# Patient Record
Sex: FEMALE | Employment: FULL TIME | URBAN - METROPOLITAN AREA
[De-identification: names, ages, dates, MRNs, and addresses within clinical notes are randomized per-mention and may not be internally consistent; named-entity substitution may affect disease eponyms.]

---

## 2019-02-18 ENCOUNTER — TRANSFERRED RECORDS (OUTPATIENT)
Dept: HEALTH INFORMATION MANAGEMENT | Facility: CLINIC | Age: 34
End: 2019-02-18

## 2019-02-18 LAB
HPV ABSTRACT: NORMAL
PAP-ABSTRACT: NORMAL

## 2019-09-18 ENCOUNTER — PRENATAL OFFICE VISIT (OUTPATIENT)
Dept: NURSING | Facility: CLINIC | Age: 34
End: 2019-09-18
Payer: COMMERCIAL

## 2019-09-18 VITALS
HEIGHT: 66 IN | WEIGHT: 157.9 LBS | HEART RATE: 74 BPM | TEMPERATURE: 97.7 F | BODY MASS INDEX: 25.38 KG/M2 | DIASTOLIC BLOOD PRESSURE: 80 MMHG | SYSTOLIC BLOOD PRESSURE: 150 MMHG

## 2019-09-18 DIAGNOSIS — Z87.59 HISTORY OF POLYHYDRAMNIOS: ICD-10-CM

## 2019-09-18 DIAGNOSIS — O09.529 AMA (ADVANCED MATERNAL AGE) MULTIGRAVIDA 35+: ICD-10-CM

## 2019-09-18 DIAGNOSIS — Z87.59 HISTORY OF GESTATIONAL HYPERTENSION: ICD-10-CM

## 2019-09-18 PROBLEM — I10 ESSENTIAL HYPERTENSION: Status: ACTIVE | Noted: 2018-02-06

## 2019-09-18 LAB
ABO + RH BLD: NORMAL
ABO + RH BLD: NORMAL
ALBUMIN UR-MCNC: NEGATIVE MG/DL
ALT SERPL W P-5'-P-CCNC: 32 U/L (ref 0–50)
APPEARANCE UR: CLEAR
AST SERPL W P-5'-P-CCNC: 23 U/L (ref 0–45)
BILIRUB UR QL STRIP: NEGATIVE
BLD GP AB SCN SERPL QL: NORMAL
BLOOD BANK CMNT PATIENT-IMP: NORMAL
COLOR UR AUTO: YELLOW
CREAT SERPL-MCNC: 0.58 MG/DL (ref 0.52–1.04)
CREAT UR-MCNC: 31 MG/DL
ERYTHROCYTE [DISTWIDTH] IN BLOOD BY AUTOMATED COUNT: 12.9 % (ref 10–15)
GFR SERPL CREATININE-BSD FRML MDRD: >90 ML/MIN/{1.73_M2}
GLUCOSE UR STRIP-MCNC: NEGATIVE MG/DL
HCT VFR BLD AUTO: 36.4 % (ref 35–47)
HGB BLD-MCNC: 12.3 G/DL (ref 11.7–15.7)
HGB UR QL STRIP: NEGATIVE
KETONES UR STRIP-MCNC: NEGATIVE MG/DL
LEUKOCYTE ESTERASE UR QL STRIP: NEGATIVE
MCH RBC QN AUTO: 31 PG (ref 26.5–33)
MCHC RBC AUTO-ENTMCNC: 33.8 G/DL (ref 31.5–36.5)
MCV RBC AUTO: 92 FL (ref 78–100)
NITRATE UR QL: NEGATIVE
PH UR STRIP: 5.5 PH (ref 5–7)
PLATELET # BLD AUTO: 272 10E9/L (ref 150–450)
PROT UR-MCNC: <0.05 G/L
PROT/CREAT 24H UR: NORMAL G/G CR (ref 0–0.2)
RBC # BLD AUTO: 3.97 10E12/L (ref 3.8–5.2)
SOURCE: NORMAL
SP GR UR STRIP: <=1.005 (ref 1–1.03)
SPECIMEN EXP DATE BLD: NORMAL
UROBILINOGEN UR STRIP-ACNC: 0.2 EU/DL (ref 0.2–1)
WBC # BLD AUTO: 6.8 10E9/L (ref 4–11)

## 2019-09-18 PROCEDURE — 82565 ASSAY OF CREATININE: CPT | Performed by: OBSTETRICS & GYNECOLOGY

## 2019-09-18 PROCEDURE — 86780 TREPONEMA PALLIDUM: CPT | Performed by: OBSTETRICS & GYNECOLOGY

## 2019-09-18 PROCEDURE — 85027 COMPLETE CBC AUTOMATED: CPT | Performed by: OBSTETRICS & GYNECOLOGY

## 2019-09-18 PROCEDURE — 84450 TRANSFERASE (AST) (SGOT): CPT | Performed by: OBSTETRICS & GYNECOLOGY

## 2019-09-18 PROCEDURE — 36415 COLL VENOUS BLD VENIPUNCTURE: CPT | Performed by: OBSTETRICS & GYNECOLOGY

## 2019-09-18 PROCEDURE — 87340 HEPATITIS B SURFACE AG IA: CPT | Performed by: OBSTETRICS & GYNECOLOGY

## 2019-09-18 PROCEDURE — 84460 ALANINE AMINO (ALT) (SGPT): CPT | Performed by: OBSTETRICS & GYNECOLOGY

## 2019-09-18 PROCEDURE — 86850 RBC ANTIBODY SCREEN: CPT | Performed by: OBSTETRICS & GYNECOLOGY

## 2019-09-18 PROCEDURE — 87086 URINE CULTURE/COLONY COUNT: CPT | Performed by: OBSTETRICS & GYNECOLOGY

## 2019-09-18 PROCEDURE — 86762 RUBELLA ANTIBODY: CPT | Performed by: OBSTETRICS & GYNECOLOGY

## 2019-09-18 PROCEDURE — 84156 ASSAY OF PROTEIN URINE: CPT | Performed by: OBSTETRICS & GYNECOLOGY

## 2019-09-18 PROCEDURE — 87389 HIV-1 AG W/HIV-1&-2 AB AG IA: CPT | Performed by: OBSTETRICS & GYNECOLOGY

## 2019-09-18 PROCEDURE — 81003 URINALYSIS AUTO W/O SCOPE: CPT | Performed by: OBSTETRICS & GYNECOLOGY

## 2019-09-18 PROCEDURE — 86900 BLOOD TYPING SEROLOGIC ABO: CPT | Performed by: OBSTETRICS & GYNECOLOGY

## 2019-09-18 PROCEDURE — 86901 BLOOD TYPING SEROLOGIC RH(D): CPT | Performed by: OBSTETRICS & GYNECOLOGY

## 2019-09-18 PROCEDURE — 99207 ZZC NO CHARGE NURSE ONLY: CPT

## 2019-09-18 RX ORDER — LABETALOL 100 MG/1
100 TABLET, FILM COATED ORAL 2 TIMES DAILY
Status: ON HOLD | COMMUNITY
End: 2020-04-25

## 2019-09-18 SDOH — HEALTH STABILITY: MENTAL HEALTH
STRESS IS WHEN SOMEONE FEELS TENSE, NERVOUS, ANXIOUS, OR CAN'T SLEEP AT NIGHT BECAUSE THEIR MIND IS TROUBLED. HOW STRESSED ARE YOU?: NOT AT ALL

## 2019-09-18 SDOH — SOCIAL STABILITY: SOCIAL INSECURITY
WITHIN THE LAST YEAR, HAVE YOU BEEN KICKED, HIT, SLAPPED, OR OTHERWISE PHYSICALLY HURT BY YOUR PARTNER OR EX-PARTNER?: NO

## 2019-09-18 SDOH — SOCIAL STABILITY: SOCIAL INSECURITY: WITHIN THE LAST YEAR, HAVE YOU BEEN AFRAID OF YOUR PARTNER OR EX-PARTNER?: NO

## 2019-09-18 SDOH — SOCIAL STABILITY: SOCIAL INSECURITY
WITHIN THE LAST YEAR, HAVE TO BEEN RAPED OR FORCED TO HAVE ANY KIND OF SEXUAL ACTIVITY BY YOUR PARTNER OR EX-PARTNER?: NO

## 2019-09-18 SDOH — SOCIAL STABILITY: SOCIAL INSECURITY: WITHIN THE LAST YEAR, HAVE YOU BEEN HUMILIATED OR EMOTIONALLY ABUSED IN OTHER WAYS BY YOUR PARTNER OR EX-PARTNER?: NO

## 2019-09-18 ASSESSMENT — MIFFLIN-ST. JEOR: SCORE: 1422.98

## 2019-09-18 NOTE — LETTER
September 19, 2019      Kandi Parker  [id number] [facility]  [place] [address]        Dear ,    We are writing to inform you of your test results.    Your test results fall within the expected range(s) or remain unchanged from previous results.  Please continue with current treatment plan.    Resulted Orders   Hepatitis B surface antigen   Result Value Ref Range    Hep B Surface Agn Nonreactive NR^Nonreactive   HIV Antigen Antibody Combo   Result Value Ref Range    HIV Antigen Antibody Combo Nonreactive NR^Nonreactive          Comment:      HIV-1 p24 Ag & HIV-1/HIV-2 Ab Not Detected   Rubella Antibody IgG Quantitative   Result Value Ref Range    Rubella Antibody IgG Quantitative 122 IU/mL      Comment:      Positive.  Suggests previous exposure or immunization and probable immunity  Reference Range:    Unvaccinated Negative 0-7 IU/mL  Vaccinated or previous exposure Positive 10 IU/ml or greater     Treponema Abs w Reflex to RPR and Titer   Result Value Ref Range    Treponema Antibodies Nonreactive NR^Nonreactive   Urine Culture Aerobic Bacterial   Result Value Ref Range    Specimen Description Midstream Urine     Culture Micro       10,000 to 50,000 colonies/mL  mixed urogenital cruz         If you have any questions or concerns, please call the clinic at the number listed above.       Sincerely,        [facility] Nurse

## 2019-09-18 NOTE — PROGRESS NOTES
Important Information for Provider:     Patient presents for new ob teaching and labs, second pregnancy,AMA. History of C section previous pregnancy due to hypertension, she was induced. Ordered first trimester screening/NIPT. Handouts reviewed and given. Recommended B6, Unisom for nausea. Has NOB with Dr Mcfadden 10/10/19. Takes Labetalol 100 mg twice dally for hypertension, monitors her blood pressure at home.  Ordered extra labs with NOB labs due to hypertension.   Prenatal OB Questionnaire  Patient supplied answers from flow sheet for:  Prenatal OB Questionnaire.  Past Medical History  Diabetes?: No  Hypertension : (!) Yes  Heart disease, mitral valve prolapse or rheumatic fever?: No  An autoimmune disease such as lupus or rheumatoid arthritis?: No  Kidney disease or urinary tract infection?: No  Epilepsy, seizures or spells?: No  Migraine headaches?:No  A stroke or loss of function or sensation?: No  Any other neurological problems?: No  Have you ever been treated for depression?: No  Are you having problems with crying spells or loss of self-esteem?: No  Have you ever required psychiatric care?: No  Have you ever had hepatitis, liver disease or jaundice?: No  Have you been treated for blood clots in your veins, deep vein thrombosis, inflammation in the veins, thrombosis, phlebitis, pulmonary embolism or varicosities?: No  Have you had excessive bleeding after surgery or dental work?: No  Do you bleed more than other women after a cut or scratch?: No  Do you have a history of anemia?:No  Have you ever had thyroid problems or taken thyroid medication?: No   Do you have any endocrine problems?: No  Have you ever been in a major accident or suffered serious trauma?: No  Within the last year, has anyone hit, slapped, kicked or otherwise hurt you?: No  In the last year, has anyone forced you to have sex when you didn't want to?: No    Past Medical History 2   Have you ever received a blood transfusion?: No  Would you  refuse a blood transfusion if a doctor judged it to be medically necessary?: No   If you answered Yes, would you rather die than receive a blood transfusion?: No  If you answered Yes, is this for Druze reasons?: No  Does anyone in your home smoke?: No  Do you use tobacco products?: No  Do you drink beer, wine or hard liquor?: No  Do you use any of the following: marijuana, speed, cocaine, heroin, hallucinogens or other drugs?: No   Is your blood type Rh negative?: No  Have you ever had abnormal antibodies in your blood?: No  Have you ever had asthma?: No  Have you ever had tuberculosis?: No  Do you have any allergies to drugs or over-the-counter medications?: No  Allergies: Dust Mites, Aspartame, Ethanol, Venlafaxine, Hydrochloride, Sertraline: No  Have you had any breast problems?: No  Have you ever ?: (!) Yes  Have you had any gynecological surgical procedures such as cervical conization, a LEEP procedure, laser treatment, cryosurgery of the cervix or a dilation and curettage, etc?: No  Have you ever had any other surgical procedures?: (!) Yes C section  Have you been hospitalized for a nonsurgical reason excluding normal delivery?: No  Have you ever had any anesthetic complications?: No  Have you ever had an abnormal pap smear?: No    Past Medical History (Continued)  Do you have a history of abnormalities of the uterus?: No  Did your mother take MAURO or any other hormones when she was pregnant with you?: No  Did it take you more than a year to become pregnant?: No  Have you ever been evaluated or treated for infertility?: No  Is there a history of medical problems in your family, which you feel may be important to this pregnancy?: No  Do you have any other problems we have not asked about which you feel may be important to this pregnancy?: No    Symptoms since last menstrual period  Do you have any of the following symptoms: abdominal pain, blood in stools or urine, chest pain, shortness of breath,  coughing or vomiting up blood, your heart racing or skipping beats, nausea and vomiting, pain on urination or vaginal discharge or bleed: (!) Yes  Will the patient be 35 years old or older at the time of delivery?: (!) Yes    Has the patient, baby's father or anyone in either family had:  Thalassemia (Italian, Greek, Mediterranean or  background only) and an MCV result less than 80?: No  Neural tube defect such as meningomyelocele, spina bifida or anencephaly?: No  Congenital heart defect?: No  Down's Syndrome?: No  Julio-Sachs disease (Tenriism, Cajun, Cambodian-[place])?: No  Sickle cell disease or trait ()?: No  Hemophilia or other inherited problems of blood?: No  Muscular dystrophy?: No  Cystic fibrosis?: No  Trenton's chorea?: No  Mental retardation/autism?: No  If yes, was the person tested for fragile X?: No  Any other inherited genetic or chromosomal disorder?: No  Maternal metabolic disorder (e.g Insulin-dependent diabetes, PKU)?: No  A child with birth defects not listed above?: No  Recurrent pregnancy loss or stillbirth?: No   Has the patient had any medications/street drugs/alcohol since her last menstrual period?: No  Does the patient or baby's father have any other genetic risks?: No    Infection History   Do you object to being tested for Hepatitis B?: No  Do you object to being tested for HIV?: No   Do you feel that you are at high risk for coming in contact with the AIDS virus?: No  Have you ever been treated for tuberculosis?: No  Have you ever had a positive skin test for tuberculosis?: No  Do you live with someone who has tuberculosis?: No  Have you ever been exposed to tuberculosis?: No  Do you have genital herpes?: No  Does your partner have genital herpes?: No  Have you had a viral illness since your last period?: No  Have you ever had gonorrhea, chlamydia, syphilis, venereal warts, trichomoniasis, pelvic inflammatory disease or any other sexually transmitted disease?: No  Do you  know if you are a genital group B streptococcus carrier?: No  Have you had chicken pox/varicella?: (!) Yes   Have you been vaccinated against chicken Pox?: No  Have you had any other infectious diseases?: No      Allergies as of 9/18/2019:    Allergies as of 09/18/2019     (No Known Allergies)       Current medications are:  No current outpatient medications on file.         Early ultrasound screening tool:    Does patient have irregular periods?  No  Did patient use hormonal birth control in the three months prior to positive urine pregnancy test? No  Is the patient breastfeeding?  No  Is the patient 10 weeks or greater at time of education visit?  Yes

## 2019-09-19 ENCOUNTER — TRANSCRIBE ORDERS (OUTPATIENT)
Dept: MATERNAL FETAL MEDICINE | Facility: CLINIC | Age: 34
End: 2019-09-19

## 2019-09-19 DIAGNOSIS — O26.90 PREGNANCY RELATED CONDITION, ANTEPARTUM: Primary | ICD-10-CM

## 2019-09-19 LAB
BACTERIA SPEC CULT: NORMAL
HBV SURFACE AG SERPL QL IA: NONREACTIVE
HIV 1+2 AB+HIV1 P24 AG SERPL QL IA: NONREACTIVE
RUBV IGG SERPL IA-ACNC: 122 IU/ML
SPECIMEN SOURCE: NORMAL
T PALLIDUM AB SER QL: NONREACTIVE

## 2019-10-14 ENCOUNTER — PRENATAL OFFICE VISIT (OUTPATIENT)
Dept: OBGYN | Facility: CLINIC | Age: 34
End: 2019-10-14
Payer: COMMERCIAL

## 2019-10-14 VITALS
SYSTOLIC BLOOD PRESSURE: 150 MMHG | DIASTOLIC BLOOD PRESSURE: 92 MMHG | WEIGHT: 162 LBS | HEART RATE: 88 BPM | BODY MASS INDEX: 26.15 KG/M2

## 2019-10-14 DIAGNOSIS — Z87.59 HISTORY OF POSTPARTUM HEMORRHAGE: ICD-10-CM

## 2019-10-14 DIAGNOSIS — O09.521 MULTIGRAVIDA OF ADVANCED MATERNAL AGE IN FIRST TRIMESTER: ICD-10-CM

## 2019-10-14 DIAGNOSIS — Z98.891 HISTORY OF CESAREAN SECTION: ICD-10-CM

## 2019-10-14 DIAGNOSIS — Z3A.11 11 WEEKS GESTATION OF PREGNANCY: Primary | ICD-10-CM

## 2019-10-14 DIAGNOSIS — O10.919 CHRONIC HYPERTENSION AFFECTING PREGNANCY: ICD-10-CM

## 2019-10-14 PROCEDURE — 87491 CHLMYD TRACH DNA AMP PROBE: CPT | Performed by: OBSTETRICS & GYNECOLOGY

## 2019-10-14 PROCEDURE — 87591 N.GONORRHOEAE DNA AMP PROB: CPT | Performed by: OBSTETRICS & GYNECOLOGY

## 2019-10-14 PROCEDURE — 99207 ZZC FIRST OB VISIT: CPT | Performed by: OBSTETRICS & GYNECOLOGY

## 2019-10-14 NOTE — PROGRESS NOTES
[facility]- OBGYN  SUBJECTIVE:   JANNETTE: 2020, by Last Menstrual Period.    HPI:    This is a 35 yo  at 10w6d by LMP who presents for her first obstetrical visit.  Patient reports that she has been feeling ok.  She has occasional nausea, but no vomiting.  She denies any abdominal pain, vaginal bleeding, vaginal discharge concerns, chest pain, chest pressure, shortness of breath, vomiting, diarrhea, constipation, or edema.      Pregnancy notable for:  -history of  section for arrest of dilation  -history of gestational hypertension  -history of PPH  -chronic hypertension, currently on labetalol 50mg BID  -AMA    OBGYN Hx:    OB History    Para Term  AB Living   2 1 1 0 0 1   SAB TAB Ectopic Multiple Live Births   0 0 0 0 1      # Outcome Date GA Lbr Ranjit/2nd Weight Sex Delivery Anes PTL Lv   2 Current            1 Term 17 39w1d  3.379 kg (7 lb 7.2 oz) M -SEC Spinal N MICHI   LMP- 19  Menses- regular, q28 days  Pap hx- 17 NIL, HPV neg    PMH- chronic hypertension  PSH-  section  Meds- labetalol  Allergies- NKDA  SH- patient denies any tobacco, alcohol, or drug use in pregnancy  FH- maternal grandmother with colon cancer at age 92.  Denies family history of breast, uterine, or ovarian cancer.   ROS:   Constitutional: negative for fever, chills  Eyes: negative for vision changes  Head: negative for sore throat  Breast: negative for lumps, masses, pain  Cardiovascular: negative for chest pain, chest pressure  Respiratory: negative for shortness of breath  Gastrointestinal: positive for nausea. negative for vomiting, diarrhea, constipation  Genitourinary: see HPI  Skin: negative for skin rashes  Neurologic: positive for headache  Musculoskeletal: negative  Endocrine: negative  Psychiatric: negative  Blood/Lymph: negative      OBJECTIVE:     EXAM:  BP (!) 150/92   Pulse 88   Wt 73.5 kg (162 lb)   LMP 2019   BMI 26.15 kg/m   Body mass  index is 26.15 kg/m .    GENERAL: healthy, alert and no distress  EYES: Eyes grossly normal to inspection, conjunctivae and sclerae normal  NECK: no adenopathy, no asymmetry, masses, or scars and thyroid normal to palpation  RESP: lungs clear to auscultation - no rales, rhonchi or wheezes  BREAST: normal without masses, tenderness or nipple discharge and no palpable axillary masses or adenopathy  CV: regular rate and rhythm, normal S1 S2, no S3 or S4, no murmur, click or rub, no peripheral edema   ABDOMEN: soft, nontender, no masses and bowel sounds normal  MS: no gross musculoskeletal defects noted, no edema  SKIN: no suspicious lesions or rashes  NEURO: Normal strength and tone, mentation intact and speech normal  PSYCH: mentation appears normal, affect normal/bright    BSUS: IUP with cardiac activity present    ASSESSMENT/PLAN:     Kandi Parker is a 33 yo  at 10w6d by LMP who presents for her first obstetrical visit, currently doing well.  (Z3A.11) 11 weeks gestation of pregnancy  (primary encounter diagnosis)  Comment: Patient overall doing well. IUP with + cardiac activity.  Reviewed routine prenatal labs with patient.   Plan: NEISSERIA GONORRHOEA PCR, CHLAMYDIA TRACHOMATIS        PCR        Counseling given:   - Follow up in 4 weeks for return OB visit.  - Recommended weight gain for pregnancy: 15-25 lbs.    (Z98.891) History of  section  Comment: Discussed patient's history of  section for arrest of dilation during induction of labor for gestational hypertension.  Patient and  are certain this is their last child.  Considering permanent sterilization, but possibly vasectomy.  Discussed that she is a candidate for TOLAC, but also explained that she has never had a vaginal delivery prior and had an arrest of dilation previously, which decreases her likelihood of successful .  Also explained that we cannot use prostaglandins for an induction in a patient with a history of  .  Explained the process of tubal ligation during a  section.  Plan: continue to discuss and weigh options for route of delivery     (O10.919) Chronic hypertension affecting pregnancy  Comment: patient with mild range blood pressure on current med regimen of 50mg labetalol BID.  Baseline HELLP labs WNL.  Discussed risk of developing pre-eclampsia and ASA 81 daily starting at 12 weeks to decrease that risk.   Plan: continue labetalol 50mg BID  Patient instructed to continue taking home BP readings and if persistently high 150's systolic or >160 and/ or persistent 108-109 or >110 to call and come in for eval and medication adjustment.     (Z86.2) History of postpartum hemorrhage  Comment: Patient with PPH during her  section with last delivery  Plan: Plan type and screen at admit for next delivery    (O09.521) Multigravida of advanced maternal age in first trimester  Comment: Patient desires genetic testing.    Plan: First trimester screen and genetic counseling 10/23/19.      Elena Mabry MD

## 2019-10-14 NOTE — PATIENT INSTRUCTIONS
"On October 22nd when you are 12w0d you should start taking 81mg of aspirin daily.    Patient Education     Pregnancy: Your First Trimester Changes  The first trimester is a time of rapid development for your baby. Because your baby is growing so quickly, it is important that you start a healthy lifestyle right away. By the end of the first trimester, your baby has formed all of its major body organs and weighs just over an ounce.     Actual size of baby is 1/4\"    Month 1 (weeks 1 to 4)  The placenta (the organ that nourishes your baby) begins to form. The brain, spinal cord, heart, gastrointestinal tract, and lungs begin to develop. Your baby is about 1/4-inch long by the end of the first month.     Actual size of baby is 1\"      Month 2 (weeks 5 to 8)  All of your baby s major body organs form. The face, fingers, toes, ears, and eyes appear. By the end of the month, your baby is about 1-inch long.     Actual size of baby is 3\"      Month 3 (weeks 9 to 12)  Your baby can open and close its fists and mouth. The sexual organs begin to form. As the first trimester ends, your baby is about 3-inches long.  Date Last Reviewed: 10/1/2017    3880-3598 [employer]. [address] [place] [address]. All rights reserved. This information is not intended as a substitute for professional medical care. Always follow your healthcare professional's instructions.           Patient Education     Pregnancy: Your Weight     Your weight will be checked regularly by your healthcare provider.   Being a healthy weight is important for both you and your baby. The weight you gain now is not just extra fat. It is also the weight of your baby. And it is the increased blood and fluids to support the baby. A slow, steady rate of gain is best. How much you should gain depends on your weight before getting pregnant. Check with your healthcare provider to find out what is right for you.  Talk to your healthcare provider if " you have any questions.   If you gain too much  Gaining too much weight might cause you to feel tired or you could have a harder pregnancy or birth. If you and your healthcare provider decide you re gaining too much:    Eat fewer fats and sugars. Instead, eat fruit, vegetables, and whole-grain foods.    Drink plenty of water between meals.    Get at least 20 minutes of light exercise, like walking, each day.    Don t diet. You might not get enough of the nutrients you or your baby needs.    Keep a diet diary to help you gauge what and how much you are eating.  If you re not gaining enough  If you don t gain enough, your baby could be too small or have health problems. Women tend to gain most of their weight in the second and third trimesters. For now:    Eat many types of foods. Make sure you get enough calcium, protein, and carbohydrates.    Don t skip meals.    Eat healthy snacks.    Pick nutrient-dense, high-calorie healthy food like trail mix or protein shakes.    See a dietitian for help.    Talk to your healthcare provider if you have had an eating disorder or problems with certain foods.  The following are ways to get more calories:    Eat breakfast every day. Peanut butter or a slice of cheese on toast can give you an extra protein boost.    Snack between meals. Yogurt and dried fruits can provide protein, calcium, and minerals.    Try to eat more foods that are high in good fats, like nuts, fatty fish, avocados, and olive oil.    Drink juices made from real fruit that are high in vitamin C or beta carotene, like grapefruit juice, orange juice, papaya nectar, apricot nectar, and carrot juice.    Avoid junk food, like foods high in sugar.  Date Last Reviewed: 1/1/2018 2000-2018 [employer]. [address] [place] [address], [place] [address]. All rights reserved. This information is not intended as a substitute for professional medical care. Always follow your healthcare professional's  instructions.

## 2019-10-16 LAB
C TRACH DNA SPEC QL NAA+PROBE: NEGATIVE
N GONORRHOEA DNA SPEC QL NAA+PROBE: NEGATIVE
SPECIMEN SOURCE: NORMAL
SPECIMEN SOURCE: NORMAL

## 2019-10-21 ENCOUNTER — PRE VISIT (OUTPATIENT)
Dept: MATERNAL FETAL MEDICINE | Facility: CLINIC | Age: 34
End: 2019-10-21

## 2019-10-23 ENCOUNTER — HOSPITAL ENCOUNTER (OUTPATIENT)
Dept: ULTRASOUND IMAGING | Facility: CLINIC | Age: 34
Discharge: HOME | End: 2019-10-23
Attending: OBSTETRICS & GYNECOLOGY | Admitting: OBSTETRICS & GYNECOLOGY
Payer: COMMERCIAL

## 2019-10-23 ENCOUNTER — OFFICE VISIT (OUTPATIENT)
Dept: MATERNAL FETAL MEDICINE | Facility: CLINIC | Age: 34
End: 2019-10-23
Attending: OBSTETRICS & GYNECOLOGY
Payer: COMMERCIAL

## 2019-10-23 DIAGNOSIS — O09.521 SUPERVISION OF ELDERLY MULTIGRAVIDA IN FIRST TRIMESTER: Primary | ICD-10-CM

## 2019-10-23 DIAGNOSIS — O26.90 PREGNANCY RELATED CONDITION, ANTEPARTUM: ICD-10-CM

## 2019-10-23 DIAGNOSIS — O09.521 MULTIGRAVIDA OF ADVANCED MATERNAL AGE IN FIRST TRIMESTER: Primary | ICD-10-CM

## 2019-10-23 DIAGNOSIS — O09.521 SUPERVISION OF ELDERLY MULTIGRAVIDA IN FIRST TRIMESTER: ICD-10-CM

## 2019-10-23 PROCEDURE — 40000791 ZZHCL STATISTIC VERIFI PRENATAL TRISOMY 21,18,13: Performed by: OBSTETRICS & GYNECOLOGY

## 2019-10-23 PROCEDURE — 36415 COLL VENOUS BLD VENIPUNCTURE: CPT | Performed by: OBSTETRICS & GYNECOLOGY

## 2019-10-23 PROCEDURE — 76813 OB US NUCHAL MEAS 1 GEST: CPT

## 2019-10-23 PROCEDURE — 40000072 ZZH STATISTIC GENETIC COUNSELING, < 16 MIN: Mod: ZF | Performed by: GENETIC COUNSELOR, MS

## 2019-10-23 NOTE — PROGRESS NOTES
[facility]  Genetic Counseling Consult    Patient: Kandi Parker YOB: 1985   Date of Service: 10/23/19      Kandi Parker was seen at [facility] for genetic consultation to discuss the options for screening and testing for fetal chromosome abnormalities.  The indication for genetic counseling is advanced maternal age. The patient was accompanied by her partner, Too to today's visit.        Impression/Plan:   1.  Kandi had an ultrasound and blood draw for NIPT (Innatal test through [facility]).  Results are expected within 7-10 days, and will be available in [employer].  We will contact her to discuss the results, and a copy will be forwarded to the office of the referring OB provider. Kandi provided verbal permission for results to be left on her voicemail at [phone]. She requested the results do not include the sex.     2.  Maternal serum AFP (single marker screen) is recommended after 15 weeks to screen for open neural tube defects. A quad screen should not be performed.    3.  An 18-20 week comprehensive ultrasound is standard of care for all women 35 or older at delivery.    Pregnancy History:   /Parity:    Age at Delivery: 34 year old  JANNETTE: 2020, by Last Menstrual Period  Gestational Age: 12w1d    No significant complications or exposures were reported in the current pregnancy.    Kandi coffman pregnancy history is significant for one term c/s delivery of a healthy male complicated by gestational hypertension, polyhydramnios and post partum hemorrhage.    The patient reported she is taking labetalol and starting a baby ASA for hypertension.     Medical History:   Kandi coffman reported medical history is not expected to impact pregnancy management or risks to fetal development.       Family History:   A three-generation pedigree was obtained, and is scanned under the  Media  tab.   The following  significant findings were reported by Kandi:    Kandi's partner Too is 34 and healthy.     It was reported that Too's father was found to have IgA nephropathy. IgA nephropathy is a kidney disorder that occurs when IgA settles in the kidneys. In most instances, the cause of this condition is unknown; however, certain disorders have been linked with IgA nephropathy, such as cirrhosis of the liver, celiac disease, and HIV infection. Although IgA nephropathy usually occurs in a family with no other affected members, several cases of familial IgA nephropathy have been reported. Too was encouraged to share this information with his primary care provider and the couple can follow up if any new family history information arises.     Otherwise, the reported family history is negative for multiple miscarriages, stillbirths, birth defects, intellectual disability, known genetic conditions, and consanguinity.       Carrier Screening:   The patient reports that she and the father of the pregnancy have  ancestry:     Cystic fibrosis is an autosomal recessive genetic condition that occurs with increased frequency in individuals of  ancestry and carrier screening for this condition is available.  In addition,  screening in the [facility] includes cystic fibrosis.      Expanded carrier screening for mutations in a large panel of genes associated with autosomal recessive conditions including cystic fibrosis, spinal muscular atrophy, and others, is now available.      The patient has declined the carrier screening options reviewed today.       Risk Assessment for Chromosome Conditions:   We explained that the risk for fetal chromosome abnormalities increases with maternal age. We discussed specific features of common chromosome abnormalities, including Down syndrome, trisomy 13, trisomy 18, and sex chromosome trisomies.      - At age 34 at midtrimester, the risk to have a baby with Down syndrome  is 1 in 216.     - At age 34 at midtrimester, the risk to have a baby with any chromosome abnormality is 1 in 105.     Testing Options:   We discussed the following options:   First trimester screening    First trimester ultrasound with nuchal translucency and nasal bone assessments, maternal plasma hCG, ANGIE-A, and AFP measurement    Screens for fetal trisomy 21, trisomy 13, and trisomy 18    Cannot screen for open neural tube defects; maternal serum AFP after 15 weeks is recommended     Non-invasive Prenatal Testing (NIPT)    Maternal plasma cell-free DNA testing; first trimester ultrasound with nuchal translucency and nasal bone assessment is recommended, when appropriate    Screens for fetal trisomy 21, trisomy 13, trisomy 18, and sex chromosome aneuploidy    Cannot screen for open neural tube defects; maternal serum AFP after 15 weeks is recommended      We reviewed the benefits and limitations of this testing.  Screening tests provide a risk assessment specific to the pregnancy for certain fetal chromosome abnormalities, but cannot definitively diagnose or exclude a fetal chromosome abnormality.  Follow-up genetic counseling and consideration of diagnostic testing is recommended with any abnormal screening result.     Diagnostic tests carry inherent risks- including risk of miscarriage- that require careful consideration.  These tests can detect fetal chromosome abnormalities with greater than 99% certainty.  Results can be compromised by maternal cell contamination or mosaicism, and are limited by the resolution of cytogenetic G-banding technology.  There is no screening nor diagnostic test that can detect all forms of birth defects or mental disability.     It was a pleasure to be involved with Nikoline s care. Face-to-face time of the meeting was 30 minutes.    Harper Leonardo MS, [facility]  Maternal Fetal Medicine  [facility]  Ph: [phone]  [email or web address]    Patient seen, evaluated and  discussed with the Genetic Counseling Intern. I have verified the content of the note, which accurately reflects my assessment of the patient and the plan of care.   Supervising Genetic Counselor   Jostin Matias MS, [facility]   Maternal Fetal Medicine  [facility]   Phone: [phone]   Email: [email or web address]

## 2019-10-23 NOTE — PROGRESS NOTES
"Please see \"Imaging\" tab under \"Chart Review\" for details of today's US.    Johana Ovalles, DO    "

## 2019-10-29 ENCOUNTER — TELEPHONE (OUTPATIENT)
Dept: MATERNAL FETAL MEDICINE | Facility: CLINIC | Age: 34
End: 2019-10-29

## 2019-10-29 NOTE — TELEPHONE ENCOUNTER
October 29, 2019  Left a message for Kandi regarding her NIPT results.     Results indicate NO ANEUPLOIDY DETECTED for chromosomes 21, 18, 13, or sex chromosomes (XX).     This puts her current pregnancy at low risk for Down syndrome, trisomy 18, trisomy 13 and sex chromosome abnormalities. This test is reported to have the following sensitivities: Down syndrome- 99%, trisomy 18- 98%, and trisomy 13- 98%. Although these results are reassuring, this does not replace a standard chromosome analysis from a chorionic villus sampling or amniocentesis.     Level II ultrasound is recommended, given AMA.     MSAFP is the appropriate second trimester screening test for open neural tube defects; the maternal quad screen is not recommended.    Her results are available in her Epic chart for her primary OB to review.    This information was left in Kandi's voicemail per plan established at her genetic counseling appointment, and Kandi was encouraged to reach out if she has any questions or concerns.      Harper Leonardo MS, [facility]  Genetic Counselor  Phone: [phone]  Pager: [phone]

## 2019-10-31 LAB — LAB SCANNED RESULT: NORMAL

## 2019-11-13 ENCOUNTER — PRENATAL OFFICE VISIT (OUTPATIENT)
Dept: OBGYN | Facility: CLINIC | Age: 34
End: 2019-11-13
Payer: COMMERCIAL

## 2019-11-13 VITALS
BODY MASS INDEX: 26.79 KG/M2 | WEIGHT: 166 LBS | HEART RATE: 91 BPM | DIASTOLIC BLOOD PRESSURE: 82 MMHG | SYSTOLIC BLOOD PRESSURE: 151 MMHG

## 2019-11-13 DIAGNOSIS — O10.919 CHRONIC HYPERTENSION AFFECTING PREGNANCY: ICD-10-CM

## 2019-11-13 DIAGNOSIS — O09.522 MULTIGRAVIDA OF ADVANCED MATERNAL AGE IN SECOND TRIMESTER: Primary | ICD-10-CM

## 2019-11-13 PROCEDURE — 99207 ZZC PRENATAL VISIT: CPT | Performed by: OBSTETRICS & GYNECOLOGY

## 2019-11-13 NOTE — PATIENT INSTRUCTIONS
Patient Education     Pregnancy: Your Second Trimester Changes  Each day, you and your baby are changing and growing together. Here s a quick look at what s happening to both of you.  How you are changing  Even when you don t notice it, your body is adapting to meet the needs of your growing baby. The changes in your body might also affect your moods.  Your body  Your uterus expands as baby grows. As the weeks go by, you will feel more pressure on your bladder, stomach, and other organs. You may notice some skin color changes on your forehead, nose, or cheeks. Freckles may darken, and moles may grow. You may notice a darker line on your abdomen between your belly button and pubic bone in the midline.  Your moods  The second trimester is often easier than the first. Still, be prepared for mood swings. These are due to the increase in hormones (chemicals that affect the way organs work) produced by your body. These mood swings are a normal part of pregnancy.  How your baby is growing          Month 4  Baby s heartbeat may be heard with a Doppler (hand-held ultrasound device) by 9 to 10 weeks. Eyebrows, eyelashes, and fingernails begin to form.  Month 5  You may feel your baby move. After a growth spurt, your baby nears 10 inches. Month 6  Baby s fingerprints have formed. Your baby weighs about 1 to 2 pounds and is about 12 inches long.   Date Last Reviewed: 1/1/2018 2000-2018 [employer]. [address] [place] [address], [place] [address]. All rights reserved. This information is not intended as a substitute for professional medical care. Always follow your healthcare professional's instructions.

## 2019-11-13 NOTE — PROGRESS NOTES
[facility]- OBGYN    S: Kandi Parker is a 34 year old  at 15w1d by LMP presents today for routine prenatal visit.  She states that she has had no cramping, vaginal bleeding, fever, chills, chest pain, chest pressure, shortness of breath, nausea, vomiting, diarrhea, constipation, or edema.  She states that she has had some light occasional headaches, but has not required any medications for it.  She states that she is taking her ASA at home    Pregnancy notable for:  -history of  for arrest of dilation  -history of gestational hypertension  -chronic hypertension on labetalol  -AMA  -history of postpartum hemorrhage    O:   Patient Vitals for the past 24 hrs:   BP Pulse Weight   19 1620 (!) 151/82 91 75.3 kg (166 lb)   ]  General- awake, alert, answering questions appropriately, appear comfortable sitting up in chair  Doptones- 160 bpm  Abd- soft, non-tender, non-distended     A&P: Kandi Parker is a 34 year old  at 15w1d by LMP presents today for routine prenatal visit, currently doing well.    (O09.522) Multigravida of advanced maternal age in second trimester  (primary encounter diagnosis)  Comment: Currently doing well.  S/p genetic counseling.  NIPT no aneuploidy detected.    Plan: level 2 ultrasound scheduled for 12/3/19   Next OB visit with Dr. Mason scheduled for 19    (O10.919) Chronic hypertension affecting pregnancy  Comment: blood pressure mild range and stable for the patient today.  She states that at home it is usually 117/80.  No signs or symptoms of pre-eclampsia.    Plan: Continue ASA 81 daily.  Continue labetalol at current dose.     Elena Mabry MD

## 2019-12-09 ENCOUNTER — PRENATAL OFFICE VISIT (OUTPATIENT)
Dept: OBGYN | Facility: CLINIC | Age: 34
End: 2019-12-09
Payer: COMMERCIAL

## 2019-12-09 VITALS
BODY MASS INDEX: 27.64 KG/M2 | OXYGEN SATURATION: 100 % | HEIGHT: 66 IN | HEART RATE: 99 BPM | DIASTOLIC BLOOD PRESSURE: 90 MMHG | SYSTOLIC BLOOD PRESSURE: 152 MMHG | WEIGHT: 172 LBS

## 2019-12-09 DIAGNOSIS — O10.919 CHRONIC HYPERTENSION AFFECTING PREGNANCY: Primary | ICD-10-CM

## 2019-12-09 DIAGNOSIS — O09.522 MULTIGRAVIDA OF ADVANCED MATERNAL AGE IN SECOND TRIMESTER: ICD-10-CM

## 2019-12-09 PROCEDURE — 99207 ZZC PRENATAL VISIT: CPT | Performed by: OBSTETRICS & GYNECOLOGY

## 2019-12-09 ASSESSMENT — MIFFLIN-ST. JEOR: SCORE: 1486.94

## 2019-12-09 NOTE — PROGRESS NOTES
Doing well.   BPs lowish at home, 100s-110s/70s. Brought her cuff in during her last pregnancy for comparison but hasn't since then. Describes mom with hypertension, and she herself has been borderline for a long time. Wore a 24 hour monitor at one point and was diagnosed with white coat hypertension. Did have elevated blood pressures in clinic and at home during her last pregnancy and was induced at 39 weeks. Induction failed and she had . Blood pressures remained elevated postpartum and she has been on 50 mg labetalol bid.   Was induced at 39 weeks, made it to 7 cm, then contractions weren't strong enough.   Feeling fetal movement.   Fetal survey with MFM next week.   Discussed chronic hypertension, management during pregnancy, timing of delivery. Discussed challenges in inducing TOLAC at 38-39 weeks if not favorable. She is unsure on mode of delivery.   RTC 5 weeks for GCT. Bring blood pressure cuff to clinic.

## 2019-12-18 ENCOUNTER — HOSPITAL ENCOUNTER (OUTPATIENT)
Dept: ULTRASOUND IMAGING | Facility: CLINIC | Age: 34
Discharge: HOME | End: 2019-12-18
Attending: OBSTETRICS & GYNECOLOGY | Admitting: OBSTETRICS & GYNECOLOGY
Payer: COMMERCIAL

## 2019-12-18 ENCOUNTER — OFFICE VISIT (OUTPATIENT)
Dept: MATERNAL FETAL MEDICINE | Facility: CLINIC | Age: 34
End: 2019-12-18
Attending: OBSTETRICS & GYNECOLOGY
Payer: COMMERCIAL

## 2019-12-18 DIAGNOSIS — O09.521 SUPERVISION OF ELDERLY MULTIGRAVIDA IN FIRST TRIMESTER: ICD-10-CM

## 2019-12-18 DIAGNOSIS — O09.522 ENCOUNTER FOR SUPERVISION OF MULTIGRAVIDA OF ADVANCED MATERNAL AGE IN SECOND TRIMESTER: ICD-10-CM

## 2019-12-18 DIAGNOSIS — Z87.59 HISTORY OF GESTATIONAL HYPERTENSION: Primary | ICD-10-CM

## 2019-12-18 PROCEDURE — 76811 OB US DETAILED SNGL FETUS: CPT

## 2019-12-18 NOTE — PROGRESS NOTES
Please see full imaging report from [facility] program under imaging tab.      Joseph Smith MD  Maternal Fetal Medicine

## 2020-01-16 ENCOUNTER — APPOINTMENT (OUTPATIENT)
Dept: LAB | Facility: CLINIC | Age: 35
End: 2020-01-16
Payer: COMMERCIAL

## 2020-01-16 ENCOUNTER — PRENATAL OFFICE VISIT (OUTPATIENT)
Dept: OBGYN | Facility: CLINIC | Age: 35
End: 2020-01-16
Payer: COMMERCIAL

## 2020-01-16 VITALS
WEIGHT: 176 LBS | TEMPERATURE: 98 F | OXYGEN SATURATION: 100 % | DIASTOLIC BLOOD PRESSURE: 76 MMHG | HEIGHT: 66 IN | HEART RATE: 86 BPM | SYSTOLIC BLOOD PRESSURE: 150 MMHG | BODY MASS INDEX: 28.28 KG/M2

## 2020-01-16 DIAGNOSIS — O09.522 MULTIGRAVIDA OF ADVANCED MATERNAL AGE IN SECOND TRIMESTER: Primary | ICD-10-CM

## 2020-01-16 LAB — HGB BLD-MCNC: 11.4 G/DL (ref 11.7–15.7)

## 2020-01-16 PROCEDURE — 82950 GLUCOSE TEST: CPT | Performed by: OBSTETRICS & GYNECOLOGY

## 2020-01-16 PROCEDURE — 00000218 ZZHCL STATISTIC OBHBG - HEMOGLOBIN: Performed by: OBSTETRICS & GYNECOLOGY

## 2020-01-16 PROCEDURE — 36415 COLL VENOUS BLD VENIPUNCTURE: CPT | Performed by: OBSTETRICS & GYNECOLOGY

## 2020-01-16 PROCEDURE — 99207 ZZC PRENATAL VISIT: CPT | Performed by: OBSTETRICS & GYNECOLOGY

## 2020-01-16 ASSESSMENT — MIFFLIN-ST. JEOR: SCORE: 1505.08

## 2020-01-16 NOTE — PROGRESS NOTES
Childbirth classes? NO  Plan on breastfeeding? Yes: will talk to insurance about a pump.  Birthcontrol? unsure  Sex on ultrasound? girl  Circumsion? NA  Peds doc? [facility].    Good fetal movement.   GCT today.   TDaP next visit.  BP today is good. Clinic BP matches home cuff, which she brought today. At home in am her BPs are 110s/60s. No change to BP meds now.  Occ round ligament pain.   RTC 4 weeks.

## 2020-01-17 LAB — GLUCOSE 1H P 50 G GLC PO SERPL-MCNC: 149 MG/DL (ref 60–129)

## 2020-01-20 DIAGNOSIS — O09.522 MULTIGRAVIDA OF ADVANCED MATERNAL AGE IN SECOND TRIMESTER: ICD-10-CM

## 2020-01-20 LAB
GLUCOSE 1H P 100 G GLC PO SERPL-MCNC: 97 MG/DL (ref 60–179)
GLUCOSE 2H P 100 G GLC PO SERPL-MCNC: 92 MG/DL (ref 60–154)
GLUCOSE 3H P 100 G GLC PO SERPL-MCNC: 87 MG/DL (ref 60–139)
GLUCOSE P FAST SERPL-MCNC: 80 MG/DL (ref 60–94)

## 2020-01-20 PROCEDURE — 82951 GLUCOSE TOLERANCE TEST (GTT): CPT | Performed by: OBSTETRICS & GYNECOLOGY

## 2020-01-20 PROCEDURE — 82952 GTT-ADDED SAMPLES: CPT | Performed by: OBSTETRICS & GYNECOLOGY

## 2020-01-20 PROCEDURE — 36415 COLL VENOUS BLD VENIPUNCTURE: CPT | Performed by: OBSTETRICS & GYNECOLOGY

## 2020-01-29 ENCOUNTER — HOSPITAL ENCOUNTER (OUTPATIENT)
Dept: ULTRASOUND IMAGING | Facility: CLINIC | Age: 35
Discharge: HOME | End: 2020-01-29
Attending: OBSTETRICS & GYNECOLOGY | Admitting: OBSTETRICS & GYNECOLOGY
Payer: COMMERCIAL

## 2020-01-29 ENCOUNTER — OFFICE VISIT (OUTPATIENT)
Dept: MATERNAL FETAL MEDICINE | Facility: CLINIC | Age: 35
End: 2020-01-29
Attending: OBSTETRICS & GYNECOLOGY
Payer: COMMERCIAL

## 2020-01-29 DIAGNOSIS — Z87.59 HISTORY OF GESTATIONAL HYPERTENSION: ICD-10-CM

## 2020-01-29 DIAGNOSIS — O10.919 CHRONIC HYPERTENSION AFFECTING PREGNANCY: ICD-10-CM

## 2020-01-29 DIAGNOSIS — Z87.59 HISTORY OF GESTATIONAL HYPERTENSION: Primary | ICD-10-CM

## 2020-01-29 PROCEDURE — 76816 OB US FOLLOW-UP PER FETUS: CPT

## 2020-01-29 NOTE — PROGRESS NOTES
Please refer to ultrasound report under 'Imaging' Studies of 'Chart Review' tabs.    Wesley Hoang M.D.

## 2020-02-13 ENCOUNTER — PRENATAL OFFICE VISIT (OUTPATIENT)
Dept: OBGYN | Facility: CLINIC | Age: 35
End: 2020-02-13
Payer: COMMERCIAL

## 2020-02-13 VITALS
WEIGHT: 180.6 LBS | HEART RATE: 90 BPM | BODY MASS INDEX: 29.02 KG/M2 | DIASTOLIC BLOOD PRESSURE: 83 MMHG | HEIGHT: 66 IN | SYSTOLIC BLOOD PRESSURE: 142 MMHG | OXYGEN SATURATION: 99 % | TEMPERATURE: 98.4 F

## 2020-02-13 DIAGNOSIS — O09.523 MULTIGRAVIDA OF ADVANCED MATERNAL AGE IN THIRD TRIMESTER: Primary | ICD-10-CM

## 2020-02-13 DIAGNOSIS — Z23 NEED FOR TDAP VACCINATION: ICD-10-CM

## 2020-02-13 PROCEDURE — 90471 IMMUNIZATION ADMIN: CPT | Performed by: OBSTETRICS & GYNECOLOGY

## 2020-02-13 PROCEDURE — 90715 TDAP VACCINE 7 YRS/> IM: CPT | Performed by: OBSTETRICS & GYNECOLOGY

## 2020-02-13 PROCEDURE — 99207 ZZC PRENATAL VISIT: CPT | Performed by: OBSTETRICS & GYNECOLOGY

## 2020-02-13 ASSESSMENT — ANXIETY QUESTIONNAIRES
5. BEING SO RESTLESS THAT IT IS HARD TO SIT STILL: NOT AT ALL
3. WORRYING TOO MUCH ABOUT DIFFERENT THINGS: NOT AT ALL
1. FEELING NERVOUS, ANXIOUS, OR ON EDGE: NOT AT ALL
IF YOU CHECKED OFF ANY PROBLEMS ON THIS QUESTIONNAIRE, HOW DIFFICULT HAVE THESE PROBLEMS MADE IT FOR YOU TO DO YOUR WORK, TAKE CARE OF THINGS AT HOME, OR GET ALONG WITH OTHER PEOPLE: NOT DIFFICULT AT ALL
6. BECOMING EASILY ANNOYED OR IRRITABLE: NOT AT ALL
GAD7 TOTAL SCORE: 0
2. NOT BEING ABLE TO STOP OR CONTROL WORRYING: NOT AT ALL
7. FEELING AFRAID AS IF SOMETHING AWFUL MIGHT HAPPEN: NOT AT ALL

## 2020-02-13 ASSESSMENT — PATIENT HEALTH QUESTIONNAIRE - PHQ9
SUM OF ALL RESPONSES TO PHQ QUESTIONS 1-9: 0
5. POOR APPETITE OR OVEREATING: NOT AT ALL

## 2020-02-13 ASSESSMENT — MIFFLIN-ST. JEOR: SCORE: 1525.95

## 2020-02-13 NOTE — PROGRESS NOTES
Clinic Administered Medication Documentation    MEDICATION LIST:   Injectable Medication Documentation    Patient was given tdap. Prior to medication administration, verified patients identity using patient s name and date of birth. Please see MAR and medication order for additional information. Patient instructed to remain in clinic for 15 minutes.      Was entire vial of medication used? Yes  Vial/Syringe: Syringe  Expiration Date:  11/13/2021  Was this medication supplied by the patient? No

## 2020-02-13 NOTE — PROGRESS NOTES
Doing well.   TDaP today.   Brief febrile illness at home. Resolved rapidly.   Good fetal movement.   Growth US on 1/29 showed 2 pounds 5 ounces, 67th %ile.   RTC 4 weeks.

## 2020-02-14 ENCOUNTER — TELEPHONE (OUTPATIENT)
Dept: OBGYN | Facility: CLINIC | Age: 35
End: 2020-02-14

## 2020-02-14 ASSESSMENT — ANXIETY QUESTIONNAIRES: GAD7 TOTAL SCORE: 0

## 2020-02-14 NOTE — TELEPHONE ENCOUNTER
Forms received and filled out, signed by provider. Faxed to number provided and sent to HIM.  Lydia Rothman,

## 2020-02-27 ENCOUNTER — HOSPITAL ENCOUNTER (OUTPATIENT)
Dept: ULTRASOUND IMAGING | Facility: CLINIC | Age: 35
Discharge: HOME | End: 2020-02-27
Attending: OBSTETRICS & GYNECOLOGY | Admitting: OBSTETRICS & GYNECOLOGY
Payer: COMMERCIAL

## 2020-02-27 ENCOUNTER — OFFICE VISIT (OUTPATIENT)
Dept: MATERNAL FETAL MEDICINE | Facility: CLINIC | Age: 35
End: 2020-02-27
Attending: OBSTETRICS & GYNECOLOGY
Payer: COMMERCIAL

## 2020-02-27 DIAGNOSIS — O10.919 CHRONIC HYPERTENSION AFFECTING PREGNANCY: ICD-10-CM

## 2020-02-27 DIAGNOSIS — O10.919 CHRONIC HYPERTENSION AFFECTING PREGNANCY: Primary | ICD-10-CM

## 2020-02-27 PROCEDURE — 76816 OB US FOLLOW-UP PER FETUS: CPT

## 2020-02-27 NOTE — PROGRESS NOTES
"Please see \"Imaging\" tab under \"Chart Review\" for details of today's visit.    Yazmin Adam    "

## 2020-03-01 ENCOUNTER — HEALTH MAINTENANCE LETTER (OUTPATIENT)
Age: 35
End: 2020-03-01

## 2020-03-05 ENCOUNTER — PRENATAL OFFICE VISIT (OUTPATIENT)
Dept: OBGYN | Facility: CLINIC | Age: 35
End: 2020-03-05
Payer: COMMERCIAL

## 2020-03-05 DIAGNOSIS — O09.523 MULTIGRAVIDA OF ADVANCED MATERNAL AGE IN THIRD TRIMESTER: Primary | ICD-10-CM

## 2020-03-05 DIAGNOSIS — Z98.891 HISTORY OF CESAREAN DELIVERY: ICD-10-CM

## 2020-03-05 PROCEDURE — 99207 ZZC PRENATAL VISIT: CPT | Performed by: OBSTETRICS & GYNECOLOGY

## 2020-03-05 RX ORDER — BREAST PUMP
1 EACH MISCELLANEOUS PRN
Qty: 1 EACH | Refills: 0 | Status: SHIPPED | OUTPATIENT
Start: 2020-03-05 | End: 2020-03-19

## 2020-03-05 ASSESSMENT — MIFFLIN-ST. JEOR: SCORE: 1537.74

## 2020-03-05 NOTE — Clinical Note
Maternity leave paperwork was done for 6 weeks. She is having scheduled repeat CS. Does it need to be redone?

## 2020-03-09 VITALS
TEMPERATURE: 98.2 F | BODY MASS INDEX: 29.44 KG/M2 | WEIGHT: 183.2 LBS | HEART RATE: 87 BPM | DIASTOLIC BLOOD PRESSURE: 87 MMHG | HEIGHT: 66 IN | OXYGEN SATURATION: 97 % | SYSTOLIC BLOOD PRESSURE: 135 MMHG

## 2020-03-09 PROBLEM — Z98.891 HISTORY OF CESAREAN DELIVERY: Status: ACTIVE | Noted: 2020-03-09

## 2020-03-09 PROBLEM — O09.523 MULTIGRAVIDA OF ADVANCED MATERNAL AGE IN THIRD TRIMESTER: Status: ACTIVE | Noted: 2020-03-09

## 2020-03-09 NOTE — PROGRESS NOTES
Doing well.   Good fetal movement.   Last growth US on 2/27 showed 3 pounds 11 ounces, 59th %ile. BREECH.  BPs at home have been normal.   Very small dose of labetalol.   Scheduling repeat CS for 39 weeks, discussed we will need to move up if BPs worsen.  Has BPPs and growth scheduled.   RTC 2 weeks.

## 2020-03-11 ENCOUNTER — OFFICE VISIT (OUTPATIENT)
Dept: MATERNAL FETAL MEDICINE | Facility: CLINIC | Age: 35
End: 2020-03-11
Attending: OBSTETRICS & GYNECOLOGY
Payer: COMMERCIAL

## 2020-03-11 ENCOUNTER — HOSPITAL ENCOUNTER (OUTPATIENT)
Dept: ULTRASOUND IMAGING | Facility: CLINIC | Age: 35
End: 2020-03-11
Attending: OBSTETRICS & GYNECOLOGY
Payer: COMMERCIAL

## 2020-03-11 DIAGNOSIS — O10.919 CHRONIC HYPERTENSION AFFECTING PREGNANCY: ICD-10-CM

## 2020-03-11 DIAGNOSIS — O10.919 CHRONIC HYPERTENSION AFFECTING PREGNANCY: Primary | ICD-10-CM

## 2020-03-11 PROCEDURE — 76819 FETAL BIOPHYS PROFIL W/O NST: CPT

## 2020-03-11 NOTE — PROGRESS NOTES
"Please see \"Imaging\" tab under \"Chart Review\" for details of today's US at the [facility].    Eleuterio Abebe MD  Maternal-Fetal Medicine      "

## 2020-03-18 ENCOUNTER — OFFICE VISIT (OUTPATIENT)
Dept: MATERNAL FETAL MEDICINE | Facility: CLINIC | Age: 35
End: 2020-03-18
Attending: OBSTETRICS & GYNECOLOGY
Payer: COMMERCIAL

## 2020-03-18 ENCOUNTER — HOSPITAL ENCOUNTER (OUTPATIENT)
Dept: ULTRASOUND IMAGING | Facility: CLINIC | Age: 35
End: 2020-03-18
Attending: OBSTETRICS & GYNECOLOGY
Payer: COMMERCIAL

## 2020-03-18 DIAGNOSIS — O10.919 CHRONIC HYPERTENSION AFFECTING PREGNANCY: Primary | ICD-10-CM

## 2020-03-18 DIAGNOSIS — O10.919 CHRONIC HYPERTENSION AFFECTING PREGNANCY: ICD-10-CM

## 2020-03-18 PROCEDURE — 76819 FETAL BIOPHYS PROFIL W/O NST: CPT

## 2020-03-19 ENCOUNTER — VIRTUAL VISIT (OUTPATIENT)
Dept: OBGYN | Facility: CLINIC | Age: 35
End: 2020-03-19
Payer: COMMERCIAL

## 2020-03-19 DIAGNOSIS — I10 ESSENTIAL HYPERTENSION: ICD-10-CM

## 2020-03-19 DIAGNOSIS — O09.523 MULTIGRAVIDA OF ADVANCED MATERNAL AGE IN THIRD TRIMESTER: Primary | ICD-10-CM

## 2020-03-19 PROCEDURE — 99207 ZZC PRENATAL VISIT: CPT | Performed by: OBSTETRICS & GYNECOLOGY

## 2020-03-19 NOTE — PROGRESS NOTES
"Kandi Parker is a 34 year old female who is being evaluated via a billable telephone visit.      The patient has been notified of following:     \"This telephone visit will be conducted via a call between you and your physician/provider. We have found that certain health care needs can be provided without the need for a physical exam.  This service lets us provide the care you need with a short phone conversation.  If a prescription is necessary we can send it directly to your pharmacy.  If lab work is needed we can place an order for that and you can then stop by our lab to have the test done at a later time.    If during the course of the call the physician/provider feels a telephone visit is not appropriate, you will not be charged for this service.\"     Kandi Parker complains of    Chief Complaint   Patient presents with     Prenatal Care       I have reviewed and updated the patient's Past Medical History, Social History, Family History and Medication List.    ALLERGIES  Patient has no known allergies.    Additional provider notes:   Doing well.   Good fetal movement.  MFM BPP  Normal yesterday. Cephalic.   BPs have been normal at home.     Assessment/Plan:  1. Multigravida of advanced maternal age in third trimester  Doing well. Reviewed precautions.   Plan to schedule CS 38-39 weeks if BP stays normal.  In person visit in 2 weeks for GBS, hgb.     2. Essential hypertension  BPs stable. Checks at home.  Getting BPPs.   Reviewed precautions for superimposed pre-eclampsia.      Mari Mason MD    "

## 2020-03-25 ENCOUNTER — TELEPHONE (OUTPATIENT)
Dept: OBGYN | Facility: CLINIC | Age: 35
End: 2020-03-25

## 2020-03-25 ENCOUNTER — PREP FOR PROCEDURE (OUTPATIENT)
Dept: OBGYN | Facility: CLINIC | Age: 35
End: 2020-03-25

## 2020-03-25 ENCOUNTER — HOSPITAL ENCOUNTER (OUTPATIENT)
Facility: CLINIC | Age: 35
Discharge: HOME | End: 2020-03-25
Attending: OBSTETRICS & GYNECOLOGY | Admitting: OBSTETRICS & GYNECOLOGY
Payer: COMMERCIAL

## 2020-03-25 VITALS
HEIGHT: 66 IN | TEMPERATURE: 98.3 F | SYSTOLIC BLOOD PRESSURE: 118 MMHG | BODY MASS INDEX: 29.25 KG/M2 | WEIGHT: 182 LBS | DIASTOLIC BLOOD PRESSURE: 78 MMHG | RESPIRATION RATE: 18 BRPM

## 2020-03-25 PROBLEM — O10.919 CHRONIC HYPERTENSION AFFECTING PREGNANCY: Status: ACTIVE | Noted: 2020-03-25

## 2020-03-25 LAB
ALT SERPL W P-5'-P-CCNC: 46 U/L (ref 0–50)
AST SERPL W P-5'-P-CCNC: 36 U/L (ref 0–45)
CREAT SERPL-MCNC: 0.57 MG/DL (ref 0.52–1.04)
CREAT UR-MCNC: 133 MG/DL
GFR SERPL CREATININE-BSD FRML MDRD: >90 ML/MIN/{1.73_M2}
HGB BLD-MCNC: 11.7 G/DL (ref 11.7–15.7)
PLATELET # BLD AUTO: 265 10E9/L (ref 150–450)
PROT UR-MCNC: 0.21 G/L
PROT/CREAT 24H UR: 0.15 G/G CR (ref 0–0.2)

## 2020-03-25 PROCEDURE — 84450 TRANSFERASE (AST) (SGOT): CPT | Performed by: STUDENT IN AN ORGANIZED HEALTH CARE EDUCATION/TRAINING PROGRAM

## 2020-03-25 PROCEDURE — 59025 FETAL NON-STRESS TEST: CPT

## 2020-03-25 PROCEDURE — 82565 ASSAY OF CREATININE: CPT | Performed by: STUDENT IN AN ORGANIZED HEALTH CARE EDUCATION/TRAINING PROGRAM

## 2020-03-25 PROCEDURE — 85049 AUTOMATED PLATELET COUNT: CPT | Performed by: STUDENT IN AN ORGANIZED HEALTH CARE EDUCATION/TRAINING PROGRAM

## 2020-03-25 PROCEDURE — 84460 ALANINE AMINO (ALT) (SGPT): CPT | Performed by: STUDENT IN AN ORGANIZED HEALTH CARE EDUCATION/TRAINING PROGRAM

## 2020-03-25 PROCEDURE — 85018 HEMOGLOBIN: CPT | Performed by: STUDENT IN AN ORGANIZED HEALTH CARE EDUCATION/TRAINING PROGRAM

## 2020-03-25 PROCEDURE — G0463 HOSPITAL OUTPT CLINIC VISIT: HCPCS | Mod: 25

## 2020-03-25 PROCEDURE — 36415 COLL VENOUS BLD VENIPUNCTURE: CPT | Performed by: STUDENT IN AN ORGANIZED HEALTH CARE EDUCATION/TRAINING PROGRAM

## 2020-03-25 PROCEDURE — 84156 ASSAY OF PROTEIN URINE: CPT | Performed by: STUDENT IN AN ORGANIZED HEALTH CARE EDUCATION/TRAINING PROGRAM

## 2020-03-25 RX ORDER — ONDANSETRON 2 MG/ML
4 INJECTION INTRAMUSCULAR; INTRAVENOUS EVERY 6 HOURS PRN
Status: DISCONTINUED | OUTPATIENT
Start: 2020-03-25 | End: 2020-03-25

## 2020-03-25 ASSESSMENT — MIFFLIN-ST. JEOR: SCORE: 1532.3

## 2020-03-25 NOTE — PLAN OF CARE
Data: [facility] at 1337.   Reason for maternal/fetal assessment per patient is Hypertension  . Patient is a . Prenatal record reviewed.      OB History    Para Term  AB Living   2 1 1 0 0 1   SAB TAB Ectopic Multiple Live Births   0 0 0 0 1      # Outcome Date GA Lbr Ranjit/2nd Weight Sex Delivery Anes PTL Lv   2 Current            1 Term 17 39w1d  3.379 kg (7 lb 7.2 oz) M -SEC Spinal N MICHI      Medical History:   Past Medical History:   Diagnosis Date     Hypertension    . Gestational Age 34w1d. VSS. Cervix: closed per Dr Bacon.  Fetal movement present. Patient denies cramping, backache, vaginal discharge, pelvic pressure, UTI symptoms, GI problems, bloody show, vaginal bleeding, edema, headache, visual disturbances, epigastric or URQ pain, abdominal pain, rupture of membranes.  Reports elevated blood pressures on home monitor (150s/90s).  Pt also cristina on the monitor but reports not feeling them.  Action: Verbal consent for EFM. Triage assessment completed. EFM applied. Uterine assessment per toco. Fetal assessment: Presumed adequate fetal oxygenation documented (see flow record). Patient education pamphlets given on fetal movement counts and preeclampsia and labor precautions. Patient instructed to report change in fetal movement, vaginal leaking of fluid or bleeding, abdominal pain, or any concerns related to the pregnancy to her nurse/physician.   Response: Dr. Mercado informed of patient arrival and elevated BPs at home. All BPs normal here except one diastolic reading of 91.  Plan per provider is discharge to home. Patient verbalized understanding of education and verbalized agreement with plan. Discharged ambulatory at 1507.

## 2020-03-25 NOTE — TELEPHONE ENCOUNTER
Type of surgery: ob  Location of surgery: [facility] [place] [facility]  Date and time of surgery: 4/24/20 830a  Surgeon: Carol  Pre-Op Appt Date: day of, Surgeon to do  Post-Op Appt Date: 6 weeks   Packet sent out: Yes  Pre-cert/Authorization completed:  No  Date: 3/25/2020    Molly VIDALES, Surgery Coordinator

## 2020-03-25 NOTE — DISCHARGE INSTRUCTIONS
Discharge Instruction for Undelivered Patients      You were seen for: Preeclampsia evaluation  We Consulted: Dr Bacon, Dr Mabry  You had (Test or Medicine):Fetal monitoring, serial blood pressures, labs.     Diet:   Drink 8 to 12 glasses of liquids (milk, juice, water) every day.  You may eat meals and snacks.     Activity:  Count fetal kicks everyday (see handout)  Call your doctor or nurse midwife if your baby is moving less than usual.     Call your provider if you notice:  Swelling in your face or increased swelling in your hands or legs.  Headaches that are not relieved by Tylenol (acetaminophen).  Changes in your vision (blurring: seeing spots or stars.)  Nausea (sick to your stomach) and vomiting (throwing up).   Weight gain of 5 pounds or more per week.  Heartburn that doesn't go away.  Signs of bladder infection: pain when you urinate (use the toilet), need to go more often and more urgently.  The bag of cordoba (rupture of membranes) breaks, or you notice leaking in your underwear.  Bright red blood in your underwear.  Abdominal (lower belly) or stomach pain.  Second (plus) baby: Contractions (tightening) less than 10 minutes apart and getting stronger.  *If less than 34 weeks: Contractions (tightenings) more than 6 times in one hour.  Increase or change in vaginal discharge (note the color and amount)  Other: High blood pressures with home monitor.    Follow-up:  As scheduled in the clinic

## 2020-03-25 NOTE — PROGRESS NOTES
"Obstetrics Triage Note    HPI:  Kandi Parker is a 34 year old  at 34w1d, pregnancy complicated by chronic hypertension, presenting to triage with a chief complaint of elevated blood pressures at home.  She states that she had previously been getting BP readings of 110s/70s at home, but now creeping up to 130s, then today systolic 155 and 161.  She is not on any BP meds.    Patient states that she feels well.  She denies headache, vision changes, chest pain, SOB, abdominal pain, contractions, vaginal bleeding, leakage of fluids.  Normal fetal movement.    ROS:  Negative except as mentioned in HPI.    PMH:  Past Medical History:   Diagnosis Date     Hypertension      PSH:  Past Surgical History:   Procedure Laterality Date      SECTION  2017     Medications:  Current Facility-Administered Medications   Medication     NO Rho (D) immune globulin (RhoGam) needed - mother Rh POSITIVE     Allergies:   No Known Allergies    Physical Exam:   Patient Vitals for the past 24 hrs:   BP Temp Temp src Resp Height Weight   20 1433 118/78 -- -- -- -- --   20 1419 119/77 -- -- -- -- --   20 1409 -- -- -- -- 1.676 m (5' 6\") 82.6 kg (182 lb)   20 1404 (!) 138/91 -- -- -- -- --   20 1347 133/87 98.3  F (36.8  C) Oral 18 -- --     Gen: resting comfortably, in NAD  Pulm: no increased work of breathing  Abd: soft, gravid, non-tender, non-distended    SVE: closed, long, high    NST:  FHT: , moderate variability, + accels, no decels  Keiser: 2/10 minutes    Labs:   Results for orders placed or performed during the hospital encounter of 20 (from the past 24 hour(s))   Protein  random urine with Creat Ratio   Result Value Ref Range    Protein Random Urine 0.21 g/L    Protein Total Urine g/gr Creatinine 0.15 0 - 0.2 g/g Cr   Creatinine urine calculation only   Result Value Ref Range    Creatinine Urine 133 mg/dL   AST   Result Value Ref Range    AST 36 0 - 45 U/L   Hemoglobin "   Result Value Ref Range    Hemoglobin 11.7 11.7 - 15.7 g/dL   Platelet count   Result Value Ref Range    Platelet Count 265 150 - 450 10e9/L   ALT   Result Value Ref Range    ALT 46 0 - 50 U/L   Creatinine   Result Value Ref Range    Creatinine 0.57 0.52 - 1.04 mg/dL    GFR Estimate >90 >60 mL/min/[1.73_m2]    GFR Estimate If Black >90 >60 mL/min/[1.73_m2]     Assessment/Plan: Kandi Parker is a 34 year old female  at 34w1d presenting with severe range blood pressure at home, blood pressures all at goal in triage, normal HELLP labs, and asymptomatic for severe features of preeclampsia.  Contractions on monitor felt to be minimally noticeable by patient, cervix closed.  Patient will plan to PO hydrate as she has not been drinking much water today.  Discharge to home with follow up as scheduled . Discussed tylenol for pain as needed. Discussed preeclampsia and labor warning signs and indication to return to care.  Plan for repeat HELLP labs at next prenatal visit.    Romi Bacon MD  OBGYN PGY-4  1:57 PM 3/25/2020    Attestation:   This patient was seen and evaluated by me, separately from the house staff team. I have reviewed the note/plan above and agree.   We further discussed stressors such as her mom moving in with them to gt ready to care for the baby.  Her and her spouse working from home.  And overall anxiety with COVID19.  She states that she thinks her BP was high due to anxiety.  We discussed anxiety management with meditation apps, which she has been starting to try.  Discussed continued BP monitoring.  Patient with normal to mild range BP and HELLP labs WNL today.      Category 1, reactive NST    Elena Mabry MD

## 2020-03-27 ENCOUNTER — OFFICE VISIT (OUTPATIENT)
Dept: MATERNAL FETAL MEDICINE | Facility: CLINIC | Age: 35
End: 2020-03-27
Attending: OBSTETRICS & GYNECOLOGY
Payer: COMMERCIAL

## 2020-03-27 ENCOUNTER — HOSPITAL ENCOUNTER (OUTPATIENT)
Dept: ULTRASOUND IMAGING | Facility: CLINIC | Age: 35
End: 2020-03-27
Attending: OBSTETRICS & GYNECOLOGY
Payer: COMMERCIAL

## 2020-03-27 DIAGNOSIS — O10.919 CHRONIC HYPERTENSION AFFECTING PREGNANCY: ICD-10-CM

## 2020-03-27 DIAGNOSIS — O10.919 CHRONIC HYPERTENSION AFFECTING PREGNANCY: Primary | ICD-10-CM

## 2020-03-27 PROCEDURE — 76816 OB US FOLLOW-UP PER FETUS: CPT

## 2020-03-27 PROCEDURE — 76819 FETAL BIOPHYS PROFIL W/O NST: CPT | Performed by: OBSTETRICS & GYNECOLOGY

## 2020-04-01 ENCOUNTER — HOSPITAL ENCOUNTER (OUTPATIENT)
Dept: ULTRASOUND IMAGING | Facility: CLINIC | Age: 35
End: 2020-04-01
Attending: OBSTETRICS & GYNECOLOGY
Payer: COMMERCIAL

## 2020-04-01 ENCOUNTER — OFFICE VISIT (OUTPATIENT)
Dept: MATERNAL FETAL MEDICINE | Facility: CLINIC | Age: 35
End: 2020-04-01
Attending: OBSTETRICS & GYNECOLOGY
Payer: COMMERCIAL

## 2020-04-01 ENCOUNTER — PRENATAL OFFICE VISIT (OUTPATIENT)
Dept: OBGYN | Facility: CLINIC | Age: 35
End: 2020-04-01
Payer: COMMERCIAL

## 2020-04-01 VITALS
HEART RATE: 87 BPM | OXYGEN SATURATION: 97 % | SYSTOLIC BLOOD PRESSURE: 128 MMHG | TEMPERATURE: 97.4 F | HEIGHT: 66 IN | WEIGHT: 185.9 LBS | BODY MASS INDEX: 29.88 KG/M2 | DIASTOLIC BLOOD PRESSURE: 79 MMHG

## 2020-04-01 DIAGNOSIS — O09.523 MULTIGRAVIDA OF ADVANCED MATERNAL AGE IN THIRD TRIMESTER: Primary | ICD-10-CM

## 2020-04-01 DIAGNOSIS — O10.919 CHRONIC HYPERTENSION AFFECTING PREGNANCY: Primary | ICD-10-CM

## 2020-04-01 DIAGNOSIS — O10.919 CHRONIC HYPERTENSION AFFECTING PREGNANCY: ICD-10-CM

## 2020-04-01 LAB
CAPILLARY BLOOD COLLECTION: NORMAL
HGB BLD-MCNC: 11.8 G/DL (ref 11.7–15.7)

## 2020-04-01 PROCEDURE — 87653 STREP B DNA AMP PROBE: CPT | Performed by: OBSTETRICS & GYNECOLOGY

## 2020-04-01 PROCEDURE — 36416 COLLJ CAPILLARY BLOOD SPEC: CPT | Performed by: OBSTETRICS & GYNECOLOGY

## 2020-04-01 PROCEDURE — 00000218 ZZHCL STATISTIC OBHBG - HEMOGLOBIN: Performed by: OBSTETRICS & GYNECOLOGY

## 2020-04-01 PROCEDURE — 99207 ZZC PRENATAL VISIT: CPT | Performed by: OBSTETRICS & GYNECOLOGY

## 2020-04-01 PROCEDURE — 76819 FETAL BIOPHYS PROFIL W/O NST: CPT

## 2020-04-01 RX ORDER — ACETAMINOPHEN 325 MG/1
650 TABLET ORAL EVERY 6 HOURS PRN
Qty: 100 TABLET | Refills: 0 | Status: SHIPPED | OUTPATIENT
Start: 2020-04-01 | End: 2020-06-04

## 2020-04-01 RX ORDER — IBUPROFEN 600 MG/1
600 TABLET, FILM COATED ORAL EVERY 6 HOURS PRN
Qty: 60 TABLET | Refills: 0 | Status: SHIPPED | OUTPATIENT
Start: 2020-04-01 | End: 2020-06-04

## 2020-04-01 RX ORDER — AMOXICILLIN 250 MG
1 CAPSULE ORAL DAILY
Qty: 100 TABLET | Refills: 0 | Status: SHIPPED | OUTPATIENT
Start: 2020-04-01 | End: 2020-04-29

## 2020-04-01 ASSESSMENT — MIFFLIN-ST. JEOR: SCORE: 1549.99

## 2020-04-01 ASSESSMENT — PATIENT HEALTH QUESTIONNAIRE - PHQ9: SUM OF ALL RESPONSES TO PHQ QUESTIONS 1-9: 1

## 2020-04-01 NOTE — PROGRESS NOTES
"Please see \"Imaging\" tab under Chart Review for full details.    Trinidad Brown MD  Maternal Fetal Medicine    "

## 2020-04-01 NOTE — Clinical Note
Please scheduled patient for a clinic visit in 2 wks and weekly thereafter, please coordinate with MFM US

## 2020-04-01 NOTE — PATIENT INSTRUCTIONS
The best way to prevent an infection is to avoid being exposed to the virus. We recommend that you wash your hands frequently and avoid touching your eyes, nose or mouth.  Practice social distancing by staying home as much as possible, avoiding gatherings and minimize trips for essentials. You should especially avoid any contact with people who are sick. It is possible that people who have the virus have little or no symptoms which is why social distancing is so important to avoid spreading the virus. Clean frequently touched surfaces daily. If you do start to have symptoms such as cough, fever or mild shortness of breath, you should stay home for at least 14 days.  If your symptoms are worrisome or severe or you are scheduled during this time to have a clinic visit you should call us at [phone].    Due to anticipated shortage of blood products we recommend all pregnant women take an iron supplement (ferrous gluconate or ferrous sulfate) in addition to a prenatal vitamin.     The hospital has strict visitor restrictions at this time for your safety. You are allowed to have one healthy adult visitor during your hospital stay and it must be the same person the entire time.     For information about Covid-19 and pregnancy we look to the center for disease control, the CDC. You can look at this information online at:   https://www.cdc.gov/coronavirus/2019-ncov/hcp/pregnant-women-faq.html

## 2020-04-01 NOTE — PROGRESS NOTES
35w1d  Doing well.  Had BPP today. Weekly BPP's.    Baby is moving well.  Taking her labetalol and BP's have been labile but overall well controlled.   Discussed COVID pandemic and changes made to routine prenatal care including intermittent phone visits in order to avoid exposure. Reviewed need to take iron supplementation.   COVID instructions sent to her AVS and she was encouraged to read through that as well.      GBS and hgb today,  PP meds sent to her pharmacy. RR

## 2020-04-02 LAB
GP B STREP DNA SPEC QL NAA+PROBE: NEGATIVE
SPECIMEN SOURCE: NORMAL

## 2020-04-08 ENCOUNTER — HOSPITAL ENCOUNTER (OUTPATIENT)
Dept: ULTRASOUND IMAGING | Facility: CLINIC | Age: 35
End: 2020-04-08
Attending: OBSTETRICS & GYNECOLOGY
Payer: COMMERCIAL

## 2020-04-08 ENCOUNTER — OFFICE VISIT (OUTPATIENT)
Dept: MATERNAL FETAL MEDICINE | Facility: CLINIC | Age: 35
End: 2020-04-08
Attending: OBSTETRICS & GYNECOLOGY
Payer: COMMERCIAL

## 2020-04-08 DIAGNOSIS — O10.919 CHRONIC HYPERTENSION AFFECTING PREGNANCY: ICD-10-CM

## 2020-04-08 DIAGNOSIS — O10.919 CHRONIC HYPERTENSION AFFECTING PREGNANCY: Primary | ICD-10-CM

## 2020-04-08 PROCEDURE — 76819 FETAL BIOPHYS PROFIL W/O NST: CPT

## 2020-04-14 NOTE — PROGRESS NOTES
37w1d  Getting anxious before upcoming delivery.  Had emergency c/s with PPH last time, so has a lot of anxiety.  PPH was due to atony after prolonged induction, so, while it's possible to happen again, it's also very likely it wont.  Reviewed how the typical day goes when having a c/s, reassured her it will be a lot different this time.  Offered that she can make a playlist of music to play during surgery if that will help.  Has PP meds.  Will start iron.  RTC weekly until delivery  Rosa Gutierrez MD

## 2020-04-15 ENCOUNTER — HOSPITAL ENCOUNTER (OUTPATIENT)
Dept: ULTRASOUND IMAGING | Facility: CLINIC | Age: 35
End: 2020-04-15
Attending: OBSTETRICS & GYNECOLOGY
Payer: COMMERCIAL

## 2020-04-15 ENCOUNTER — OFFICE VISIT (OUTPATIENT)
Dept: MATERNAL FETAL MEDICINE | Facility: CLINIC | Age: 35
End: 2020-04-15
Attending: OBSTETRICS & GYNECOLOGY
Payer: COMMERCIAL

## 2020-04-15 ENCOUNTER — PRENATAL OFFICE VISIT (OUTPATIENT)
Dept: OBGYN | Facility: CLINIC | Age: 35
End: 2020-04-15
Payer: COMMERCIAL

## 2020-04-15 VITALS
TEMPERATURE: 98.7 F | SYSTOLIC BLOOD PRESSURE: 138 MMHG | DIASTOLIC BLOOD PRESSURE: 70 MMHG | HEART RATE: 93 BPM | BODY MASS INDEX: 30.51 KG/M2 | WEIGHT: 189 LBS

## 2020-04-15 DIAGNOSIS — O09.523 MULTIGRAVIDA OF ADVANCED MATERNAL AGE IN THIRD TRIMESTER: Primary | ICD-10-CM

## 2020-04-15 DIAGNOSIS — O10.919 CHRONIC HYPERTENSION AFFECTING PREGNANCY: Primary | ICD-10-CM

## 2020-04-15 DIAGNOSIS — O10.919 CHRONIC HYPERTENSION AFFECTING PREGNANCY: ICD-10-CM

## 2020-04-15 PROCEDURE — 99207 ZZC PRENATAL VISIT: CPT | Performed by: OBSTETRICS & GYNECOLOGY

## 2020-04-15 PROCEDURE — 76819 FETAL BIOPHYS PROFIL W/O NST: CPT

## 2020-04-15 RX ORDER — FERROUS SULFATE 325(65) MG
325 TABLET ORAL
Qty: 60 TABLET | Refills: 1 | Status: SHIPPED | OUTPATIENT
Start: 2020-04-15 | End: 2020-04-29

## 2020-04-15 NOTE — PATIENT INSTRUCTIONS
The best way to prevent an infection is to avoid being exposed to the virus. We recommend that you wash your hands frequently and avoid touching your eyes, nose or mouth.  Practice social distancing by staying home as much as possible, avoiding gatherings and minimize trips for essentials. You should especially avoid any contact with people who are sick. It is possible that people who have the virus have little or no symptoms which is why social distancing is so important to avoid spreading the virus. Clean frequently touched surfaces daily. If you do start to have symptoms such as cough, fever or mild shortness of breath, you should stay home for at least 14 days.  If your symptoms are worrisome or severe or you are scheduled during this time to have a clinic visit you should call us at [phone].    Due to anticipated shortage of blood products we recommend all pregnant women take an iron supplement (ferrous gluconate or ferrous sulfate) in addition to a prenatal vitamin.     The hospital has strict visitor restrictions at this time for your safety. Please be aware that visitor restrictions are subject to change. You are allowed to have one healthy adult visitor during your hospital stay, it must be the same person the entire time and they must stay with you at the hospital the entire time (they are not allowed to come and go).     For information about Covid-19 and pregnancy we look to the center for disease control, the CDC. You can look at this information online at:   https://www.cdc.gov/coronavirus/2019-ncov/hcp/pregnant-women-faq.html

## 2020-04-20 ENCOUNTER — ANESTHESIA EVENT (OUTPATIENT)
Dept: OBGYN | Facility: CLINIC | Age: 35
End: 2020-04-20
Payer: COMMERCIAL

## 2020-04-21 ENCOUNTER — OFFICE VISIT (OUTPATIENT)
Dept: URGENT CARE | Facility: URGENT CARE | Age: 35
End: 2020-04-21
Payer: COMMERCIAL

## 2020-04-21 DIAGNOSIS — Z20.822 SUSPECTED COVID-19 VIRUS INFECTION: Primary | ICD-10-CM

## 2020-04-21 LAB
SARS-COV-2 PCR COMMENT: NORMAL
SARS-COV-2 RNA SPEC QL NAA+PROBE: NEGATIVE
SARS-COV-2 RNA SPEC QL NAA+PROBE: NORMAL
SPECIMEN SOURCE: NORMAL
SPECIMEN SOURCE: NORMAL

## 2020-04-21 PROCEDURE — 87635 SARS-COV-2 COVID-19 AMP PRB: CPT | Performed by: OBSTETRICS & GYNECOLOGY

## 2020-04-21 NOTE — PATIENT INSTRUCTIONS
If you were tested, we will call you with your COVID-19 result. You don't need to call us to check on your result. You can also use the information at the end of this document to sign up for [facility] UserZoom where you can get your results and a message about those results sent to you through the UserZoom application.    Regardless of if you have been tested or not:  Patient who have symptoms (cough, fever, or shortness of breath), need to isolate for 7 days from when symptoms started AND 72 hours after fever resolves (without fever reducing medications) AND improvement of respiratory symptoms (whichever is longer).      Isolate yourself at home (in own room/own bathroom if possible)    Do Not allow any visitors    Do Not go to work or school    Do Not go to [employer],  centers, shopping, or other public places.    Do Not shake hands.    Avoid close and intimate contact with others (hugging, kissing).    Follow CDC recommendations for household cleaning of frequently touched services.     After the initial 7 days, continue to isolate yourself from household members as much as possible. To continue decrease the risk of community spread and exposure, you and any members of your household should limit activities in public for 14 days after starting home isolation.     You can reference the following CDC link for helpful home isolation/care tips:  https://www.cdc.gov/coronavirus/2019-ncov/downloads/10Things.pdf    Protect Others:    Cover Your Mouth and Nose with a mask, disposable tissue or wash cloth to avoid spreading germs to others.    Wash your hands and face frequently with soap and water    Call Back If: Breathing difficulty develops or you become worse.    For more information about COVID19 and options for caring for yourself at home, please visit the CDC website at https://www.cdc.gov/coronavirus/2019-ncov/about/steps-when-sick.html  For more options for care at [facility], please visit  our website at [email or web address]    For more information, please use the [place] Department of Health COVID-19 Website: https://www.health.[facility].[place]/diseases/coronavirus/index.html  [place] Department of Health ([facility]) COVID-19 Hotlines (Interpreters available):      Health questions: Phone Number: [phone] or 1-875.629.6872 and Hours: 7 a.m. to 7 p.m.    Schools and  questions: Phone Number: [phone] or [phone] and Hours 7 a.m. to 7 p.m.

## 2020-04-22 ENCOUNTER — HOSPITAL ENCOUNTER (OUTPATIENT)
Dept: ULTRASOUND IMAGING | Facility: CLINIC | Age: 35
End: 2020-04-22
Attending: OBSTETRICS & GYNECOLOGY
Payer: COMMERCIAL

## 2020-04-22 ENCOUNTER — OFFICE VISIT (OUTPATIENT)
Dept: MATERNAL FETAL MEDICINE | Facility: CLINIC | Age: 35
End: 2020-04-22
Attending: OBSTETRICS & GYNECOLOGY
Payer: COMMERCIAL

## 2020-04-22 ENCOUNTER — PRENATAL OFFICE VISIT (OUTPATIENT)
Dept: OBGYN | Facility: CLINIC | Age: 35
End: 2020-04-22
Payer: COMMERCIAL

## 2020-04-22 VITALS
SYSTOLIC BLOOD PRESSURE: 135 MMHG | BODY MASS INDEX: 30.51 KG/M2 | DIASTOLIC BLOOD PRESSURE: 86 MMHG | HEART RATE: 98 BPM | WEIGHT: 189 LBS | TEMPERATURE: 98 F

## 2020-04-22 DIAGNOSIS — O09.93 HIGH-RISK PREGNANCY IN THIRD TRIMESTER: Primary | ICD-10-CM

## 2020-04-22 DIAGNOSIS — O10.919 CHRONIC HYPERTENSION AFFECTING PREGNANCY: ICD-10-CM

## 2020-04-22 DIAGNOSIS — O10.919 CHRONIC HYPERTENSION AFFECTING PREGNANCY: Primary | ICD-10-CM

## 2020-04-22 PROCEDURE — 99207 ZZC PRENATAL VISIT: CPT | Performed by: OBSTETRICS & GYNECOLOGY

## 2020-04-22 PROCEDURE — 76819 FETAL BIOPHYS PROFIL W/O NST: CPT

## 2020-04-22 NOTE — PROGRESS NOTES
38w1d  Was called to come in for COVID-19 testing this week.  Asymptomatic.  Not sure who called her.  Results negative.  I will look into it, but perhaps routine screening in advance of scheduled c/s?  Doing well.  No contractions, vaginal bleeding or leakage of fluid. +FM  Reviewed current visitor restrictions in the hospital, as well as the need to bring everything with you, including car seat.  Repeat c/s scheduled in 2 days.  Has pp meds at home.  Rosa Gutierrez MD

## 2020-04-24 ENCOUNTER — HOSPITAL ENCOUNTER (INPATIENT)
Facility: CLINIC | Age: 35
LOS: 2 days | Discharge: HOME | End: 2020-04-26
Attending: OBSTETRICS & GYNECOLOGY | Admitting: OBSTETRICS & GYNECOLOGY
Payer: COMMERCIAL

## 2020-04-24 ENCOUNTER — ANESTHESIA (OUTPATIENT)
Dept: OBGYN | Facility: CLINIC | Age: 35
End: 2020-04-24
Payer: COMMERCIAL

## 2020-04-24 DIAGNOSIS — Z98.891 HISTORY OF CESAREAN DELIVERY: ICD-10-CM

## 2020-04-24 DIAGNOSIS — O09.523 MULTIGRAVIDA OF ADVANCED MATERNAL AGE IN THIRD TRIMESTER: ICD-10-CM

## 2020-04-24 DIAGNOSIS — Z98.891 S/P CESAREAN SECTION: Primary | ICD-10-CM

## 2020-04-24 DIAGNOSIS — Z98.891 STATUS POST CESAREAN SECTION: ICD-10-CM

## 2020-04-24 PROBLEM — O34.219 PREVIOUS CESAREAN DELIVERY AFFECTING PREGNANCY: Status: ACTIVE | Noted: 2020-04-24

## 2020-04-24 LAB
ABO + RH BLD: NORMAL
ABO + RH BLD: NORMAL
ALT SERPL W P-5'-P-CCNC: 38 U/L (ref 0–50)
AST SERPL W P-5'-P-CCNC: 34 U/L (ref 0–45)
BLD GP AB SCN SERPL QL: NORMAL
BLOOD BANK CMNT PATIENT-IMP: NORMAL
CREAT SERPL-MCNC: 0.58 MG/DL (ref 0.52–1.04)
CREAT UR-MCNC: 89 MG/DL
ERYTHROCYTE [DISTWIDTH] IN BLOOD BY AUTOMATED COUNT: 14 % (ref 10–15)
GFR SERPL CREATININE-BSD FRML MDRD: >90 ML/MIN/{1.73_M2}
HCT VFR BLD AUTO: 35.1 % (ref 35–47)
HGB BLD-MCNC: 11.5 G/DL (ref 11.7–15.7)
MCH RBC QN AUTO: 30.5 PG (ref 26.5–33)
MCHC RBC AUTO-ENTMCNC: 32.8 G/DL (ref 31.5–36.5)
MCV RBC AUTO: 93 FL (ref 78–100)
PLATELET # BLD AUTO: 236 10E9/L (ref 150–450)
PROT UR-MCNC: 0.29 G/L
PROT/CREAT 24H UR: 0.32 G/G CR (ref 0–0.2)
RBC # BLD AUTO: 3.77 10E12/L (ref 3.8–5.2)
SPECIMEN EXP DATE BLD: NORMAL
WBC # BLD AUTO: 11.8 10E9/L (ref 4–11)

## 2020-04-24 PROCEDURE — 25000125 ZZHC RX 250: Performed by: STUDENT IN AN ORGANIZED HEALTH CARE EDUCATION/TRAINING PROGRAM

## 2020-04-24 PROCEDURE — 85027 COMPLETE CBC AUTOMATED: CPT | Performed by: OBSTETRICS & GYNECOLOGY

## 2020-04-24 PROCEDURE — 86901 BLOOD TYPING SEROLOGIC RH(D): CPT | Performed by: OBSTETRICS & GYNECOLOGY

## 2020-04-24 PROCEDURE — 37000008 ZZH ANESTHESIA TECHNICAL FEE, 1ST 30 MIN: Performed by: OBSTETRICS & GYNECOLOGY

## 2020-04-24 PROCEDURE — 86900 BLOOD TYPING SEROLOGIC ABO: CPT | Performed by: OBSTETRICS & GYNECOLOGY

## 2020-04-24 PROCEDURE — 25000132 ZZH RX MED GY IP 250 OP 250 PS 637: Performed by: STUDENT IN AN ORGANIZED HEALTH CARE EDUCATION/TRAINING PROGRAM

## 2020-04-24 PROCEDURE — 71000014 ZZH RECOVERY PHASE 1 LEVEL 2 FIRST HR: Performed by: OBSTETRICS & GYNECOLOGY

## 2020-04-24 PROCEDURE — 25000128 H RX IP 250 OP 636: Performed by: OBSTETRICS & GYNECOLOGY

## 2020-04-24 PROCEDURE — 71000015 ZZH RECOVERY PHASE 1 LEVEL 2 EA ADDTL HR: Performed by: OBSTETRICS & GYNECOLOGY

## 2020-04-24 PROCEDURE — 37000009 ZZH ANESTHESIA TECHNICAL FEE, EACH ADDTL 15 MIN: Performed by: OBSTETRICS & GYNECOLOGY

## 2020-04-24 PROCEDURE — 36000059 ZZH SURGERY LEVEL 3 EA 15 ADDTL MIN UMMC: Performed by: OBSTETRICS & GYNECOLOGY

## 2020-04-24 PROCEDURE — C1765 ADHESION BARRIER: HCPCS | Performed by: OBSTETRICS & GYNECOLOGY

## 2020-04-24 PROCEDURE — 25000128 H RX IP 250 OP 636: Performed by: STUDENT IN AN ORGANIZED HEALTH CARE EDUCATION/TRAINING PROGRAM

## 2020-04-24 PROCEDURE — 84450 TRANSFERASE (AST) (SGOT): CPT | Performed by: OBSTETRICS & GYNECOLOGY

## 2020-04-24 PROCEDURE — 25800030 ZZH RX IP 258 OP 636: Performed by: STUDENT IN AN ORGANIZED HEALTH CARE EDUCATION/TRAINING PROGRAM

## 2020-04-24 PROCEDURE — 59510 CESAREAN DELIVERY: CPT | Mod: GC | Performed by: OBSTETRICS & GYNECOLOGY

## 2020-04-24 PROCEDURE — 86850 RBC ANTIBODY SCREEN: CPT | Performed by: OBSTETRICS & GYNECOLOGY

## 2020-04-24 PROCEDURE — 3E0T3BZ INTRODUCTION OF ANESTHETIC AGENT INTO PERIPHERAL NERVES AND PLEXI, PERCUTANEOUS APPROACH: ICD-10-PCS | Performed by: ANESTHESIOLOGY

## 2020-04-24 PROCEDURE — 84460 ALANINE AMINO (ALT) (SGPT): CPT | Performed by: OBSTETRICS & GYNECOLOGY

## 2020-04-24 PROCEDURE — 84156 ASSAY OF PROTEIN URINE: CPT | Performed by: STUDENT IN AN ORGANIZED HEALTH CARE EDUCATION/TRAINING PROGRAM

## 2020-04-24 PROCEDURE — 27110028 ZZH OR GENERAL SUPPLY NON-STERILE: Performed by: OBSTETRICS & GYNECOLOGY

## 2020-04-24 PROCEDURE — 25800030 ZZH RX IP 258 OP 636: Performed by: OBSTETRICS & GYNECOLOGY

## 2020-04-24 PROCEDURE — 82565 ASSAY OF CREATININE: CPT | Performed by: OBSTETRICS & GYNECOLOGY

## 2020-04-24 PROCEDURE — 36000057 ZZH SURGERY LEVEL 3 1ST 30 MIN - UMMC: Performed by: OBSTETRICS & GYNECOLOGY

## 2020-04-24 PROCEDURE — 25000132 ZZH RX MED GY IP 250 OP 250 PS 637: Performed by: OBSTETRICS & GYNECOLOGY

## 2020-04-24 PROCEDURE — C9290 INJ, BUPIVACAINE LIPOSOME: HCPCS | Performed by: STUDENT IN AN ORGANIZED HEALTH CARE EDUCATION/TRAINING PROGRAM

## 2020-04-24 PROCEDURE — 12000001 ZZH R&B MED SURG/OB UMMC

## 2020-04-24 PROCEDURE — 40000170 ZZH STATISTIC PRE-PROCEDURE ASSESSMENT II: Performed by: OBSTETRICS & GYNECOLOGY

## 2020-04-24 PROCEDURE — 27210794 ZZH OR GENERAL SUPPLY STERILE: Performed by: OBSTETRICS & GYNECOLOGY

## 2020-04-24 RX ORDER — NALOXONE HYDROCHLORIDE 0.4 MG/ML
.1-.4 INJECTION, SOLUTION INTRAMUSCULAR; INTRAVENOUS; SUBCUTANEOUS
Status: ACTIVE | OUTPATIENT
Start: 2020-04-24 | End: 2020-04-25

## 2020-04-24 RX ORDER — OXYTOCIN 10 [USP'U]/ML
10 INJECTION, SOLUTION INTRAMUSCULAR; INTRAVENOUS
Status: DISCONTINUED | OUTPATIENT
Start: 2020-04-24 | End: 2020-04-26 | Stop reason: HOSPADM

## 2020-04-24 RX ORDER — KETOROLAC TROMETHAMINE 30 MG/ML
30 INJECTION, SOLUTION INTRAMUSCULAR; INTRAVENOUS EVERY 6 HOURS
Status: COMPLETED | OUTPATIENT
Start: 2020-04-24 | End: 2020-04-25

## 2020-04-24 RX ORDER — LIDOCAINE 40 MG/G
CREAM TOPICAL
Status: DISCONTINUED | OUTPATIENT
Start: 2020-04-24 | End: 2020-04-24

## 2020-04-24 RX ORDER — MODIFIED LANOLIN
OINTMENT (GRAM) TOPICAL
Status: DISCONTINUED | OUTPATIENT
Start: 2020-04-24 | End: 2020-04-26 | Stop reason: HOSPADM

## 2020-04-24 RX ORDER — MORPHINE SULFATE 1 MG/ML
250 INJECTION, SOLUTION EPIDURAL; INTRATHECAL; INTRAVENOUS ONCE
Status: DISCONTINUED | OUTPATIENT
Start: 2020-04-24 | End: 2020-04-24

## 2020-04-24 RX ORDER — NALOXONE HYDROCHLORIDE 0.4 MG/ML
.1-.4 INJECTION, SOLUTION INTRAMUSCULAR; INTRAVENOUS; SUBCUTANEOUS
Status: DISCONTINUED | OUTPATIENT
Start: 2020-04-24 | End: 2020-04-24

## 2020-04-24 RX ORDER — SODIUM CHLORIDE, SODIUM LACTATE, POTASSIUM CHLORIDE, CALCIUM CHLORIDE 600; 310; 30; 20 MG/100ML; MG/100ML; MG/100ML; MG/100ML
INJECTION, SOLUTION INTRAVENOUS CONTINUOUS PRN
Status: DISCONTINUED | OUTPATIENT
Start: 2020-04-24 | End: 2020-04-24

## 2020-04-24 RX ORDER — SODIUM CHLORIDE, SODIUM LACTATE, POTASSIUM CHLORIDE, CALCIUM CHLORIDE 600; 310; 30; 20 MG/100ML; MG/100ML; MG/100ML; MG/100ML
INJECTION, SOLUTION INTRAVENOUS CONTINUOUS
Status: DISCONTINUED | OUTPATIENT
Start: 2020-04-24 | End: 2020-04-24 | Stop reason: HOSPADM

## 2020-04-24 RX ORDER — OXYCODONE HYDROCHLORIDE 5 MG/1
5 TABLET ORAL EVERY 4 HOURS PRN
Status: DISCONTINUED | OUTPATIENT
Start: 2020-04-24 | End: 2020-04-26 | Stop reason: HOSPADM

## 2020-04-24 RX ORDER — KETOROLAC TROMETHAMINE 30 MG/ML
INJECTION, SOLUTION INTRAMUSCULAR; INTRAVENOUS PRN
Status: DISCONTINUED | OUTPATIENT
Start: 2020-04-24 | End: 2020-04-24

## 2020-04-24 RX ORDER — LIDOCAINE 40 MG/G
CREAM TOPICAL
Status: DISCONTINUED | OUTPATIENT
Start: 2020-04-24 | End: 2020-04-26 | Stop reason: HOSPADM

## 2020-04-24 RX ORDER — SIMETHICONE 80 MG
80 TABLET,CHEWABLE ORAL 4 TIMES DAILY PRN
Status: DISCONTINUED | OUTPATIENT
Start: 2020-04-24 | End: 2020-04-26 | Stop reason: HOSPADM

## 2020-04-24 RX ORDER — BUPIVACAINE HYDROCHLORIDE 7.5 MG/ML
INJECTION, SOLUTION INTRASPINAL PRN
Status: DISCONTINUED | OUTPATIENT
Start: 2020-04-24 | End: 2020-04-24

## 2020-04-24 RX ORDER — LABETALOL 100 MG/1
100 TABLET, FILM COATED ORAL 2 TIMES DAILY
Status: DISCONTINUED | OUTPATIENT
Start: 2020-04-24 | End: 2020-04-24

## 2020-04-24 RX ORDER — ONDANSETRON 2 MG/ML
4 INJECTION INTRAMUSCULAR; INTRAVENOUS EVERY 30 MIN PRN
Status: DISCONTINUED | OUTPATIENT
Start: 2020-04-24 | End: 2020-04-24 | Stop reason: HOSPADM

## 2020-04-24 RX ORDER — EPHEDRINE SULFATE 50 MG/ML
5 INJECTION, SOLUTION INTRAMUSCULAR; INTRAVENOUS; SUBCUTANEOUS
Status: DISCONTINUED | OUTPATIENT
Start: 2020-04-24 | End: 2020-04-24

## 2020-04-24 RX ORDER — ONDANSETRON 4 MG/1
4 TABLET, ORALLY DISINTEGRATING ORAL EVERY 30 MIN PRN
Status: DISCONTINUED | OUTPATIENT
Start: 2020-04-24 | End: 2020-04-24 | Stop reason: HOSPADM

## 2020-04-24 RX ORDER — ONDANSETRON 2 MG/ML
INJECTION INTRAMUSCULAR; INTRAVENOUS PRN
Status: DISCONTINUED | OUTPATIENT
Start: 2020-04-24 | End: 2020-04-24

## 2020-04-24 RX ORDER — DEXTROSE, SODIUM CHLORIDE, SODIUM LACTATE, POTASSIUM CHLORIDE, AND CALCIUM CHLORIDE 5; .6; .31; .03; .02 G/100ML; G/100ML; G/100ML; G/100ML; G/100ML
INJECTION, SOLUTION INTRAVENOUS CONTINUOUS
Status: DISCONTINUED | OUTPATIENT
Start: 2020-04-24 | End: 2020-04-26 | Stop reason: HOSPADM

## 2020-04-24 RX ORDER — METOCLOPRAMIDE HYDROCHLORIDE 5 MG/ML
INJECTION INTRAMUSCULAR; INTRAVENOUS PRN
Status: DISCONTINUED | OUTPATIENT
Start: 2020-04-24 | End: 2020-04-24

## 2020-04-24 RX ORDER — AMOXICILLIN 250 MG
2 CAPSULE ORAL 2 TIMES DAILY
Status: DISCONTINUED | OUTPATIENT
Start: 2020-04-24 | End: 2020-04-26 | Stop reason: HOSPADM

## 2020-04-24 RX ORDER — ONDANSETRON 2 MG/ML
4 INJECTION INTRAMUSCULAR; INTRAVENOUS EVERY 6 HOURS PRN
Status: DISCONTINUED | OUTPATIENT
Start: 2020-04-24 | End: 2020-04-26 | Stop reason: HOSPADM

## 2020-04-24 RX ORDER — NALBUPHINE HYDROCHLORIDE 20 MG/ML
2.5-5 INJECTION, SOLUTION INTRAMUSCULAR; INTRAVENOUS; SUBCUTANEOUS EVERY 6 HOURS PRN
Status: DISCONTINUED | OUTPATIENT
Start: 2020-04-24 | End: 2020-04-24

## 2020-04-24 RX ORDER — ACETAMINOPHEN 325 MG/1
975 TABLET ORAL EVERY 6 HOURS
Status: DISCONTINUED | OUTPATIENT
Start: 2020-04-24 | End: 2020-04-26 | Stop reason: HOSPADM

## 2020-04-24 RX ORDER — FENTANYL CITRATE 50 UG/ML
25-50 INJECTION, SOLUTION INTRAMUSCULAR; INTRAVENOUS EVERY 5 MIN PRN
Status: DISCONTINUED | OUTPATIENT
Start: 2020-04-24 | End: 2020-04-24 | Stop reason: HOSPADM

## 2020-04-24 RX ORDER — IBUPROFEN 800 MG/1
800 TABLET, FILM COATED ORAL EVERY 6 HOURS
Status: DISCONTINUED | OUTPATIENT
Start: 2020-04-25 | End: 2020-04-26 | Stop reason: HOSPADM

## 2020-04-24 RX ORDER — PHENYLEPHRINE HCL IN 0.9% NACL 1 MG/10 ML
SYRINGE (ML) INTRAVENOUS CONTINUOUS PRN
Status: DISCONTINUED | OUTPATIENT
Start: 2020-04-24 | End: 2020-04-24

## 2020-04-24 RX ORDER — SODIUM CHLORIDE, SODIUM LACTATE, POTASSIUM CHLORIDE, CALCIUM CHLORIDE 600; 310; 30; 20 MG/100ML; MG/100ML; MG/100ML; MG/100ML
INJECTION, SOLUTION INTRAVENOUS CONTINUOUS
Status: DISCONTINUED | OUTPATIENT
Start: 2020-04-24 | End: 2020-04-24

## 2020-04-24 RX ORDER — HYDROCORTISONE 2.5 %
CREAM (GRAM) TOPICAL 3 TIMES DAILY PRN
Status: DISCONTINUED | OUTPATIENT
Start: 2020-04-24 | End: 2020-04-26 | Stop reason: HOSPADM

## 2020-04-24 RX ORDER — CEFAZOLIN SODIUM 2 G/100ML
2 INJECTION, SOLUTION INTRAVENOUS
Status: COMPLETED | OUTPATIENT
Start: 2020-04-24 | End: 2020-04-24

## 2020-04-24 RX ORDER — OXYTOCIN/0.9 % SODIUM CHLORIDE 30/500 ML
340 PLASTIC BAG, INJECTION (ML) INTRAVENOUS CONTINUOUS PRN
Status: DISCONTINUED | OUTPATIENT
Start: 2020-04-24 | End: 2020-04-26 | Stop reason: HOSPADM

## 2020-04-24 RX ORDER — MORPHINE SULFATE 1 MG/ML
INJECTION, SOLUTION EPIDURAL; INTRATHECAL; INTRAVENOUS PRN
Status: DISCONTINUED | OUTPATIENT
Start: 2020-04-24 | End: 2020-04-24

## 2020-04-24 RX ORDER — BISACODYL 10 MG
10 SUPPOSITORY, RECTAL RECTAL DAILY PRN
Status: DISCONTINUED | OUTPATIENT
Start: 2020-04-26 | End: 2020-04-26 | Stop reason: HOSPADM

## 2020-04-24 RX ORDER — AMOXICILLIN 250 MG
1 CAPSULE ORAL 2 TIMES DAILY
Status: DISCONTINUED | OUTPATIENT
Start: 2020-04-24 | End: 2020-04-26 | Stop reason: HOSPADM

## 2020-04-24 RX ORDER — CEFAZOLIN SODIUM 1 G/3ML
1 INJECTION, POWDER, FOR SOLUTION INTRAMUSCULAR; INTRAVENOUS SEE ADMIN INSTRUCTIONS
Status: DISCONTINUED | OUTPATIENT
Start: 2020-04-24 | End: 2020-04-24

## 2020-04-24 RX ORDER — BUPIVACAINE HYDROCHLORIDE 2.5 MG/ML
INJECTION, SOLUTION EPIDURAL; INFILTRATION; INTRACAUDAL PRN
Status: DISCONTINUED | OUTPATIENT
Start: 2020-04-24 | End: 2020-04-24

## 2020-04-24 RX ORDER — CITRIC ACID/SODIUM CITRATE 334-500MG
30 SOLUTION, ORAL ORAL
Status: COMPLETED | OUTPATIENT
Start: 2020-04-24 | End: 2020-04-24

## 2020-04-24 RX ORDER — OXYTOCIN/0.9 % SODIUM CHLORIDE 30/500 ML
PLASTIC BAG, INJECTION (ML) INTRAVENOUS CONTINUOUS PRN
Status: DISCONTINUED | OUTPATIENT
Start: 2020-04-24 | End: 2020-04-24

## 2020-04-24 RX ORDER — OXYTOCIN/0.9 % SODIUM CHLORIDE 30/500 ML
100 PLASTIC BAG, INJECTION (ML) INTRAVENOUS CONTINUOUS
Status: DISCONTINUED | OUTPATIENT
Start: 2020-04-24 | End: 2020-04-26 | Stop reason: HOSPADM

## 2020-04-24 RX ADMIN — SODIUM CITRATE AND CITRIC ACID MONOHYDRATE 30 ML: 500; 334 SOLUTION ORAL at 08:18

## 2020-04-24 RX ADMIN — BUPIVACAINE 20 ML: 13.3 INJECTION, SUSPENSION, LIPOSOMAL INFILTRATION at 09:54

## 2020-04-24 RX ADMIN — SODIUM CHLORIDE, POTASSIUM CHLORIDE, SODIUM LACTATE AND CALCIUM CHLORIDE: 600; 310; 30; 20 INJECTION, SOLUTION INTRAVENOUS at 07:50

## 2020-04-24 RX ADMIN — BUPIVACAINE HYDROCHLORIDE 20 ML: 2.5 INJECTION, SOLUTION EPIDURAL; INFILTRATION; INTRACAUDAL at 09:54

## 2020-04-24 RX ADMIN — OXYTOCIN-SODIUM CHLORIDE 0.9% IV SOLN 30 UNIT/500ML 340 ML/HR: 30-0.9/5 SOLUTION at 09:14

## 2020-04-24 RX ADMIN — KETOROLAC TROMETHAMINE 30 MG: 30 INJECTION, SOLUTION INTRAMUSCULAR at 21:52

## 2020-04-24 RX ADMIN — SODIUM CHLORIDE, SODIUM LACTATE, POTASSIUM CHLORIDE, CALCIUM CHLORIDE AND DEXTROSE MONOHYDRATE: 5; 600; 310; 30; 20 INJECTION, SOLUTION INTRAVENOUS at 13:27

## 2020-04-24 RX ADMIN — KETOROLAC TROMETHAMINE 30 MG: 30 INJECTION, SOLUTION INTRAMUSCULAR at 15:43

## 2020-04-24 RX ADMIN — KETOROLAC TROMETHAMINE 30 MG: 30 INJECTION, SOLUTION INTRAMUSCULAR at 09:44

## 2020-04-24 RX ADMIN — ONDANSETRON 4 MG: 2 INJECTION INTRAMUSCULAR; INTRAVENOUS at 08:10

## 2020-04-24 RX ADMIN — SENNOSIDES AND DOCUSATE SODIUM 2 TABLET: 8.6; 5 TABLET ORAL at 20:06

## 2020-04-24 RX ADMIN — METOCLOPRAMIDE HYDROCHLORIDE 10 MG: 5 INJECTION INTRAMUSCULAR; INTRAVENOUS at 08:10

## 2020-04-24 RX ADMIN — Medication 50 MCG/MIN: at 08:51

## 2020-04-24 RX ADMIN — ACETAMINOPHEN 975 MG: 325 TABLET, FILM COATED ORAL at 19:34

## 2020-04-24 RX ADMIN — SODIUM CHLORIDE, POTASSIUM CHLORIDE, SODIUM LACTATE AND CALCIUM CHLORIDE: 600; 310; 30; 20 INJECTION, SOLUTION INTRAVENOUS at 08:39

## 2020-04-24 RX ADMIN — BUPIVACAINE HYDROCHLORIDE IN DEXTROSE 1.7 ML: 7.5 INJECTION, SOLUTION SUBARACHNOID at 08:50

## 2020-04-24 RX ADMIN — MORPHINE SULFATE 0.25 MG: 1 INJECTION, SOLUTION EPIDURAL; INTRATHECAL; INTRAVENOUS at 08:50

## 2020-04-24 RX ADMIN — PHENYLEPHRINE HYDROCHLORIDE 100 MCG: 10 INJECTION INTRAVENOUS at 09:06

## 2020-04-24 RX ADMIN — ACETAMINOPHEN 975 MG: 325 TABLET, FILM COATED ORAL at 12:55

## 2020-04-24 RX ADMIN — Medication 50 MG: at 20:58

## 2020-04-24 RX ADMIN — Medication 2 G: at 08:51

## 2020-04-24 NOTE — ANESTHESIA PROCEDURE NOTES
Spinal/LP Procedure Note    Spinal Block  Staff -   Anesthesiologist:  Capri Anne MD  Resident/Fellow: Tammy Ruiz MD    Performed By: resident  Procedure performed by resident/CRNA in presence of a teaching physician.        Location: OB  Procedure Start/Stop Times:      4/24/2020 8:40 AM     4/24/2020 8:50 AM    patient identified, IV checked, site marked, risks and benefits discussed, informed consent, monitors and equipment checked, pre-op evaluation, at physician/surgeon's request and post-op pain management      Correct Patient: Yes      Correct Position: Yes      Correct Site: Yes      Correct Procedure: Yes      Correct Laterality:  Yes    Site Marked:  Yes  Procedure:     Procedure:  Intrathecal    ASA:  2    Position:  Sitting    Sterile Prep: chloraprep      Insertion site:  L3-4    Approach:  Midline    Needle Type:  Jennie    Needle gauge (G):  25    Local Skin Infiltration:  1% lidocaine    amount (ml):  3    Needle Length (in):  3.5    Introducer used: Yes      Introducer gauge:  20 G    Attempts:  2    Redirects:  2    CSF:  Clear    Time injected:  08:50

## 2020-04-24 NOTE — H&P
History and Physical     Kandi Parker MRN# [id number]   YOB: 1985 Age: 34 year old      Date of Admission: 2020    Primary care provider: Tigist Cage             HPI:     Kandi Parker is a 34 year old  at 38w3d by LMP c/w 12w5d US who presents today for scheduled repeat  section. Feeling well today. Feels good about having a repeat CS scheduled. Overall feeling well without health concerns.     Pregnancy notable for:   History of  section x1  Chronic hypertension  AMA  Failed GCT, passed GTT  History of PPH      She reports good fetal movement. Denies LOF, vaginal bleeding, or contractions.  She denies fever, chills, SOB, chest pain, palpitations, N/V, LE swelling/tenderness.  No concerns for headache, vision changes, RUQ or epigastric pain      Patient has been NPO since before midnight other than Gatorade drink as part of ERAS protocol.    OB History:    OB History    Para Term  AB Living   2 1 1 0 0 1   SAB TAB Ectopic Multiple Live Births   0 0 0 0 1      # Outcome Date GA Lbr Ranjit/2nd Weight Sex Delivery Anes PTL Lv   2 Current            1 Term 17 39w1d  3.379 kg (7 lb 7.2 oz) M -SEC Spinal N MICHI        Prenatal Lab Results:  Lab Results   Component Value Date    ABO O 2019    RH Pos 2019    AS Neg 2019    HEPBANG Nonreactive 2019    CHPCRT Negative 10/14/2019    GCPCRT Negative 10/14/2019    HGB 11.8 2020       GBS Status:   Lab Results   Component Value Date    GBS Negative 2020                Past Medical History:     Past Medical History:   Diagnosis Date     Hypertension              Past Surgical History:     Past Surgical History:   Procedure Laterality Date      SECTION  2017             Social History:     Social History     Tobacco Use     Smoking status: Never Smoker     Smokeless tobacco: Never Used   Substance Use Topics     Alcohol use: Not Currently              Family History:     Family History   Problem Relation Age of Onset     Hypertension Mother      Diabetes Type 2  Paternal Grandmother      Colon Cancer Maternal Grandmother              Immunizations:     Immunization History   Administered Date(s) Administered     TDAP Vaccine (Adacel) 2020            Allergies:   No Known Allergies          Medications:     Medications Prior to Admission   Medication Sig Dispense Refill Last Dose     ASPIRIN 81 PO    2020 at Unknown time     ferrous sulfate (FEROSUL) 325 (65 Fe) MG tablet Take 1 tablet (325 mg) by mouth daily (with breakfast) 60 tablet 1 2020 at Unknown time     Prenatal MV-Min-Fe Fum-FA-DHA (PRENATAL+DHA PO)    2020 at Unknown time     acetaminophen (TYLENOL) 325 MG tablet Take 2 tablets (650 mg) by mouth every 6 hours as needed for mild pain Start after Delivery. 100 tablet 0      ibuprofen (ADVIL/MOTRIN) 600 MG tablet Take 1 tablet (600 mg) by mouth every 6 hours as needed for moderate pain Start after delivery 60 tablet 0      labetalol (NORMODYNE) 100 MG tablet Take 100 mg by mouth 2 times daily        senna-docusate (SENOKOT-S/PERICOLACE) 8.6-50 MG tablet Take 1 tablet by mouth daily Start after delivery. 100 tablet 0              Review of Systems & Physical Exam:     The Review of Systems is negative other than noted in the HPI      BP (!) 140/84   Temp 97.8  F (36.6  C) (Oral)   Resp 16   LMP 2019   Gen: well-appearing, comfortable  CV: RR  Lungs: non-labored breathing  Abd: Gravid, non-tender, non-distended  Ext: Trace peripheral extremity edema    Presentation: [facility].  Estimated Fetal Weight: 7 lb 11 oz.    FHT:  Monitoring External  FHT: Baseline 140 bpm; mod variability; accels present; no decelerations  TOCO 0-1 contractions in 10 minutes         Data:   CBC, T&S, RPR, HELLP     Assessment and Plan:       34 year old  at 38w3d by 12w5d US, here for scheduled  section. Pregnancy is notable for  history of one prior  section, chronic hypertension, failed GCT passed GTT, AMA, history of prior PPH.       # Scheduled Repeat  Section  Indicated due to prior . Prior (s) for arrest of dilation after IOL. Previous op notes reported PPH with QBL 1137. Will plan for a Pfannenstiel skin incision with low transverse hysterotomy  - Labs: CBC, T&S, RPR   - Pre-op Hgb 11.8  - Placenta: anterior  - Anesthesia: Spinal   - 2g Ancef   - PPH Meds/Ppx: prn, avoid methergine  - Diet: NPO  - PPx: SCDs,   - Consent: Discussed risks and benefits of procedure, including but not limited to bleeding, infection, injury to surrounding organs, injury to infant, and the potential need for another surgery should some injury go unrecognized or patient were to have continued bleeding. Patient had time to ask questions and expressed understanding of procedure and associated risks. Agreed to blood transfusion if necessary. Consent signed.    # cHTN  - Serial BP monitoring   - IV Antihypertensives prn for sustained severe range blood pressures (>160/>110)  - Continue PTA labetalol 500 mg BID. Titrate as needed  - Labs: HELLP labs, UPC from adm pending. Baseline HELLP wnl UPC 0.15  - Daily weights, strict I&Os     # PNC  - Rh pos, Rubella immune, GCT failed, passed GTT, GBS neg.  - Other prenatal labs wnl  - s/p flu, Tdap vaccines  - Pap last  NIL HPV neg.     # FWB:   Cat I tracing, reactive; Ceph by BSUS; EFW 7 lb 11 oz  - Continuous Fetal Monitoring  - Intrauterine resuscitative measures prn      Patient discussed with Dr. Carol Delgado MD  Obstetrics & Gynecology, PGY-2  2020 7:46 AM      Physician Attestation   I, MARIA G RONQUILLO MD, saw this patient with the resident and agree with the resident/fellow's findings and plan of care as documented in the note.      I personally reviewed vital signs, labs and fetal tracing.    Key findings: NST reactive. 33 y/o with h/o prior c/s,  desires repeat. No questions and consent done. Will proceed.    MARIA G RONQUILLO MD  Date of Service (when I saw the patient): 04/24/20

## 2020-04-24 NOTE — PLAN OF CARE
Vss, postpartum assessments WDL. Taking tylenol and toradol for pain control. Tolerating regular diet. Patient has been out of bed and ambulated in room at least 30 feet. Gill catheter stayed in after 2 hours post delivery because patient did not have full feeling in her legs. Catheter was discontinued at 1520. Patient has yet to void. Patient is breast feeding infant with minimal assist for deeper latch and positioning. Patient is able to hand express breast milk independently. Continue with plan of care.

## 2020-04-24 NOTE — PLAN OF CARE
Patient arrived to [facility] unit via zoom cart at 1155 ,with belongings, accompanied by spouse/ significant other, with infant in arms. Received report from  Mariah LINDQUIST  and checked bands. Unit and room orientation completd. Call light within arms reach; no concerns present at this time. Continue with plan of care.

## 2020-04-24 NOTE — PLAN OF CARE
Summary:  pt is 38.3 wks  scheduled repeat .  CHTN.  Had morning dose of labetol prior to admission.  ERSA.  Had gatorade at 0500.  Lower extremity marry hugger pre/intra/post.  QBL in .  Labs sent with iv start.  Protein random - from catheter urine.  Breast feeding and skin to skin.  COVID-19 negative.  Too present and supportive.  Abuse screen not completed as not alone per visitor policy.  Clear liquids for pt while in the pacu.

## 2020-04-24 NOTE — OP NOTE
[facility]  Full Operative Progress Note     Surgery Date:  2020    Surgeon:  Carmen Medina MD    Assistants:  Carmen Delgado MD, PGY-2       Pre-op Diagnosis:    Intrauterine pregnancy at 38w3d  History of  section x1  Chronic hypertension  AMA  Failed GCT, passed GTT  History of PPH    Post-op Diagnosis:    Same   Liveborn female infant     Procedure:  Repeat low-transverse  section with double layer uterine closure via Pfannenstiel incision    Anesthesia: Spinal    EBL:  740 cc    IVF:  750 mL crystalloid    UOP:  100 mL clear urine at the end of the case    Drains: Gill Catheter     Specimens:  Cord segment    Complications: None apparent    Indications:   Kandi Parker is a 34 year old  at 38w3d admitted for scheduled repeat  section.  History was notable for one prior  section.  The risks, benefits, and alternatives of  section were discussed with the patient, and she agreed to proceed.     Findings:   - Viable female infant delivered at 0917 on 2020 with APGARs 9 and 9. Weight pending.   - Clear amniotic fluid, Placenta intact.   - Normal uterus, tubes, and ovaries.   - Moderate rectus muscle adhesions.   - Surgical sites noted to be hemostatic at the end of case.     Procedure Details:   The patient was brought to the OR, where adequate spinal anesthesia was administered.  She was placed in the dorsal supine position with a slight leftward tilt. She was prepped and draped in the usual sterile fashion. A surgical time out was performed. A pfannenstiel skin incision was made with the scalpel, and carried down to the underlying fascia with sharp and blunt dissection. The fascia was incised in the midline, and the incision was extended laterally with the Moses scissors. The superior aspect of the fascia was grasped with the Kocher clamps and dissected off of the underlying rectus muscles with blunt and sharp  dissection. Attention was then turned to the inferior aspect of the fascia, which was similarly dissected off of the underlying rectus muscles. The rectus muscles were  in the midline, and the peritoneum was entered bluntly, and the opening was extended with digital pressure. The bladder blade was placed.  A transverse hysterotomy was made with the scalpel in the lower uterine segment, and the incision was extended with digital pressure. The infant was noted to be in the OA position, and was delivered atraumatically. The shoulders delivered easily.  No nuchal cord was noted. The cord was doubly clamped and cut after 60 seconds, and the infant was handed off to the awaiting nursery staff. A segment of cord was cut and saved. The placenta was delivered with gentle traction on the umbilical cord and uterine massage. The uterus was cleared of all clots and debris. Uterine tone was noted to be firm with 20 units of pitocin given through the running IV and uterine massage.  The hysterotomy was closed with a running locked suture of 0 Monocryl.  The hysterotomy was then imbricated using an 0 Monocryl suture. The hysterotomy was noted to be hemostatic. The pericolic gutters were cleared of all clots and debris. The hysterotomy was reexamined and noted to be hemostatic. The fascia and rectus muscles were examined and areas of oozing were controlled with electrocautery. Seprafilm was applied over the hysterotomy.The fascia was closed with a running 0 Vicryl suture. The subcutaneous tissue was irrigated and areas of oozing were controlled with electrocautery. The subcutaneous tissue was less than 2 cm in thickness, and was therefore not closed. The skin was closed with 4-0 Monocryl and covered with a sterile dressing.    All sponge, needle, and instrument counts were correct. The patient tolerated the procedure well, and was transferred to recovery in stable condition. Dr. Medina was present and scrubbed for the  entirety of the procedure.     Carmen Delgado MD  OBGYN PGY-2  10:19 AM 4/24/2020    I was present and scrubbed for entirety of the surgical case and  agree with note as edited to reflect findings.      CARMEN RONQUILLO MD

## 2020-04-24 NOTE — PROGRESS NOTES
Scheduled AM C/S Admit Note  Kandi Parker  MRN: [id number]  Gestational Age: 38w3d      Kandi Parker presents for scheduled  section for CHTN, AMA, repeat c/s.  Patient denies contractions, bleeding or LOF.      Past Medical History:   Diagnosis Date     Hypertension      Past Surgical History:   Procedure Laterality Date      SECTION  2017       NST: Reactive.    Plan:  - section at 0830. Report given to LEXA Lemus.

## 2020-04-24 NOTE — PROVIDER NOTIFICATION
04/24/20 0949   Time Out (Immediately Prior to Start of Procedure)   Person Performing the Procedure Verbally Calls for Time Out Yes   Person Performing the Procedure Verifies Patient Using Two Identifiers, Site, Laterality (level if appropriate), and Verifies Site Wing if Applicable Yes   Additional Clinician Verifies Patient Using Two Identifiers, Site, Laterality (Level if Appropriate), and Verifies Site Wing if Applicable Yes   For tap block with dr walker in ob or 1

## 2020-04-24 NOTE — BRIEF OP NOTE
Brief Operative Note    Name: Kandi Parker  MRN: [id number]  : 1985  Date of Surgery: 2020    Pre-operative Diagnosis:  IUP @ 38w3d  History of  section x1  Chronic hypertension  AMA  Failed GCT, passed GTT  History of PPH    Post-operative Diagnosis:  Same    Procedure(s):   Repeat low transverse  section with double layer uterine closure via Pfannenstiel skin incision    Surgeon:  Carmen Medina MD    Assistants:  Carmen Delgado MD, PGY2     Anesthesia: Spinal  EBL: 740 ml  UOP: 100 mL clear urine  Fluids: 750 mL crystalloid  Additional Medications: 2g Ancef preop  Specimens: Cord segment  Complications: None apparent    Findings:   - Viable female infant delivered at 0917 on 2020 with APGARs 9 and 9. Weight pending.   - Clear amniotic fluid, Placenta intact.   - Normal uterus, tubes, and ovaries.   - Moderate rectus muscle adhesions.   - Surgical sites noted to be hemostatic at the end of case.     Carmen Delgado MD  Obstetrics & Gynecology, PGY-2  2020 9:54 AM

## 2020-04-24 NOTE — ANESTHESIA PREPROCEDURE EVALUATION
"Anesthesia Pre-Procedure Evaluation    Patient: Kandi Parker   MRN:     [id number] Gender:   female   Age:    34 year old :      1985        Preoperative Diagnosis: Multigravida of advanced maternal age in third trimester [O09.523]  History of  delivery [Z98.891]   Procedure(s):  REPEAT  SECTION     LABS:  CBC:   Lab Results   Component Value Date    WBC 6.8 2019    HGB 11.8 2020    HGB 11.7 2020    HCT 36.4 2019     2020     2019     BMP:   Lab Results   Component Value Date    CR 0.57 2020    CR 0.58 2019     COAGS: No results found for: PTT, INR, FIBR  POC: No results found for: BGM, HCG, HCGS  OTHER:   Lab Results   Component Value Date    ALT 46 2020    AST 36 2020        Preop Vitals    BP Readings from Last 3 Encounters:   20 135/86   04/15/20 138/70   20 128/79    Pulse Readings from Last 3 Encounters:   20 98   04/15/20 93   20 87      Resp Readings from Last 3 Encounters:   20 18    SpO2 Readings from Last 3 Encounters:   20 97%   20 97%   20 99%      Temp Readings from Last 1 Encounters:   20 36.7  C (98  F) (Oral)    Ht Readings from Last 1 Encounters:   20 1.676 m (5' 6\")      Wt Readings from Last 1 Encounters:   20 85.7 kg (189 lb)    Estimated body mass index is 30.51 kg/m  as calculated from the following:    Height as of 20: 1.676 m (5' 6\").    Weight as of 20: 85.7 kg (189 lb).     LDA:        Past Medical History:   Diagnosis Date     Hypertension       Past Surgical History:   Procedure Laterality Date      SECTION  2017      No Known Allergies     Anesthesia Evaluation     . Pt has had prior anesthetic. Type: Regional           ROS/MED HX    ENT/Pulmonary:  - neg pulmonary ROS     Neurologic:  - neg neurologic ROS     Cardiovascular:     (+) hypertension (On labetalol BID)----. : . . . :. .     "   METS/Exercise Tolerance:  >4 METS   Hematologic:  - neg hematologic  ROS       Musculoskeletal:  - neg musculoskeletal ROS       GI/Hepatic:  - neg GI/hepatic ROS       Renal/Genitourinary:  - ROS Renal section negative       Endo:  - neg endo ROS       Psychiatric:  - neg psychiatric ROS       Infectious Disease:  - neg infectious disease ROS       Malignancy:      - no malignancy   Other: Comment: 33 yo  repeat C/S    Hx of previous C/S  AMA                        PHYSICAL EXAM:   Mental Status/Neuro: A/A/O   Airway: Facies: Feasible  Mallampati: II  Mouth/Opening: Full  TM distance: > 6 cm  Neck ROM: Full   Respiratory: Auscultation: CTAB     Resp. Rate: Normal     Resp. Effort: Normal      CV: Rhythm: Regular  Rate: Age appropriate  Heart: Normal Sounds  Edema: None   Comments:      Dental: Normal Dentition                Assessment:   ASA SCORE: 2    H&P: History and physical reviewed and following examination; no interval change.   Smoking Status:  Non-Smoker/Unknown   NPO Status: ELEVATED Aspiration Risk/Unknown     Plan:   Anes. Type:  MAC; Spinal; Peripheral Nerve Block; For Post-op pain in coordination with surgeon     Block Details: TAP; Exparel; Single Shot   Pre-Medication: None   Induction:  N/a   Airway: Native Airway   Access/Monitoring: PIV   Maintenance: N/a     Postop Plan:   Postop Pain: Regional  Postop Sedation/Airway: Not planned  Disposition: Inpatient/Admit     PONV Management:   Adult Risk Factors: Female, Non-Smoker   Prevention: Ondansetron     CONSENT: Direct conversation   Plan and risks discussed with: Patient   Blood Products: Consented (ALL Blood Products)                   Tammy Ruiz MD

## 2020-04-24 NOTE — ANESTHESIA CARE TRANSFER NOTE
Patient: Kandi Parker    Procedure(s):  REPEAT  SECTION    Diagnosis: Multigravida of advanced maternal age in third trimester [O09.523]  History of  delivery [Z98.891]  Diagnosis Additional Information: No value filed.    Anesthesia Type:   MAC, Spinal, Peripheral Nerve Block, For Post-op pain in coordination with surgeon     Note:  Airway :Room Air  Patient transferred to:PACU  Comments: VSS. No apparent complications from anesthesia.Handoff Report: Identifed the Patient, Identified the Reponsible Provider, Reviewed the pertinent medical history, Discussed the surgical course, Reviewed Intra-OP anesthesia mangement and issues during anesthesia, Set expectations for post-procedure period and Allowed opportunity for questions and acknowledgement of understanding      Vitals: (Last set prior to Anesthesia Care Transfer)    CRNA VITALS  2020 0926 - 2020 1001      2020             Pulse:  70    Ht Rate:  70    SpO2:  99 %                Electronically Signed By: Tammy Ruiz MD  2020  10:01 AM

## 2020-04-24 NOTE — DISCHARGE SUMMARY
[facility] Discharge Summary    Kandi Parker MRN# [id number]   Age: 34 year old YOB: 1985     Date of Admission:  2020  Date of Discharge::  20   Admitting Physician:  Carmen Medina MD  Discharge Physician:  Tiff Joseph MD              Admission Diagnoses:   Intrauterine pregnancy at 38w3d  History of  section x1  Chronic hypertension  AMA  Failed GCT, passed GTT  History of PPH          Discharge Diagnosis:     Same, delivered           Procedures:     Procedure(s): Repeat low transverse  section with double layer closure via Pfannenstiel skin incision  TAP block  Spinal                Medications Prior to Admission:     Medications Prior to Admission   Medication Sig Dispense Refill Last Dose     ferrous sulfate (FEROSUL) 325 (65 Fe) MG tablet Take 1 tablet (325 mg) by mouth daily (with breakfast) 60 tablet 1 2020 at Unknown time     Prenatal MV-Min-Fe Fum-FA-DHA (PRENATAL+DHA PO)    2020 at Unknown time     acetaminophen (TYLENOL) 325 MG tablet Take 2 tablets (650 mg) by mouth every 6 hours as needed for mild pain Start after Delivery. 100 tablet 0      ibuprofen (ADVIL/MOTRIN) 600 MG tablet Take 1 tablet (600 mg) by mouth every 6 hours as needed for moderate pain Start after delivery 60 tablet 0      senna-docusate (SENOKOT-S/PERICOLACE) 8.6-50 MG tablet Take 1 tablet by mouth daily Start after delivery. 100 tablet 0      [DISCONTINUED] ASPIRIN 81 PO    2020 at Unknown time     [DISCONTINUED] labetalol (NORMODYNE) 100 MG tablet Take 100 mg by mouth 2 times daily                Discharge Medications:        Review of your medicines      START taking      Dose / Directions   oxyCODONE 5 MG tablet  Commonly known as:  ROXICODONE      Dose:  5 mg  Take 1 tablet (5 mg) by mouth every 6 hours as needed for pain  Quantity:  10 tablet  Refills:  0        CONTINUE these medicines which may have CHANGED, or have new prescriptions. If we are  uncertain of the size of tablets/capsules you have at home, strength may be listed as something that might have changed.      Dose / Directions   labetalol 100 MG tablet  Commonly known as:  NORMODYNE  Indication:  High Blood Pressure Disorder  This may have changed:  how much to take  Used for:  S/P  section      Dose:  50 mg  Take 0.5 tablets (50 mg) by mouth 2 times daily  Quantity:  60 tablet  Refills:  1        CONTINUE these medicines which have NOT CHANGED      Dose / Directions   acetaminophen 325 MG tablet  Commonly known as:  TYLENOL  Used for:  Multigravida of advanced maternal age in third trimester      Dose:  650 mg  Take 2 tablets (650 mg) by mouth every 6 hours as needed for mild pain Start after Delivery.  Quantity:  100 tablet  Refills:  0     ferrous sulfate 325 (65 Fe) MG tablet  Commonly known as:  FEROSUL  Used for:  Multigravida of advanced maternal age in third trimester      Dose:  325 mg  Take 1 tablet (325 mg) by mouth daily (with breakfast)  Quantity:  60 tablet  Refills:  1     ibuprofen 600 MG tablet  Commonly known as:  ADVIL/MOTRIN  Used for:  Multigravida of advanced maternal age in third trimester      Dose:  600 mg  Take 1 tablet (600 mg) by mouth every 6 hours as needed for moderate pain Start after delivery  Quantity:  60 tablet  Refills:  0     PRENATAL+DHA PO      Refills:  0     senna-docusate 8.6-50 MG tablet  Commonly known as:  SENOKOT-S/PERICOLACE  Used for:  Multigravida of advanced maternal age in third trimester      Dose:  1 tablet  Take 1 tablet by mouth daily Start after delivery.  Quantity:  100 tablet  Refills:  0        STOP taking    ASPIRIN 81 PO              Where to get your medicines      These medications were sent to [facility] [place], [place] - [address]  [address] [address]    Phone:  [phone]     labetalol 100 MG tablet     Some of these will need a paper prescription and others can be bought  over the counter. Ask your nurse if you have questions.    Bring a paper prescription for each of these medications    oxyCODONE 5 MG tablet             Consultations:   None          Brief Admission History:   Ms. Kandi Parker is a 34 year old now  who initially presented at 38w3d for scheduled repeat .       Intraoperative course   The procedure was uncomplicated.   mL.  See operative report for details.     - Viable female infant delivered at 0917 on 2020 with APGARs 9 and 9. Weight pending.   - Clear amniotic fluid, Placenta intact.   - Normal uterus, tubes, and ovaries.   - Moderate rectus muscle adhesions.   - Surgical sites noted to be hemostatic at the end of case.        Postpartum Course   The patient's hospital course was unremarkable.  She recovered as anticipated and experienced no post-operative complications.  On discharge, her pain was well controlled. Vaginal bleeding is similar to peak menstrual flow.  Voiding without difficulty.  Ambulating well and tolerating a normal diet.  No fever or significant wound drainage.  Breastfeeding well.  Infant is stable. Bowel function returned  She was discharged on post-partum day #2.    Post-partum hemoglobin:   Hemoglobin   Date Value Ref Range Status   2020 11.1 (L) 11.7 - 15.7 g/dL Final             Discharge Instructions and Follow-Up:     Discharge diet: Regular   Discharge activity: No lifting greater than 20 lbs, pushing, pulling, or other strenuous activity for 6 weeks. Pelvic rest for 6 weeks including no sexual intercourse, tampons, or douching. No driving until you can slam on the breaks without pain or while on narcotic pain medications.    Discharge follow-up: Follow up with primary OB for routine postpartum visit in 6 weeks  Blood pressure check    Wound care: Keep incision clean and dry           Discharge Disposition:     Discharged to home      Yudelka Xiong MD   OB/GYN Resident PGY-3  2020 8:18 AM          Physician Attestation   I, Tiff Joseph, have seen and examined this patient.  I have reviewed the above note and agree with discharge plan as documented in the above note.    Tiff Joseph MD  Date of Service (when I saw the patient): 04/26/20

## 2020-04-24 NOTE — ANESTHESIA PROCEDURE NOTES
Peripheral Nerve Block Procedure Note  Staff -   Anesthesiologist:  Capri Anne MD  Resident/Fellow: Tammy Ruiz MD    Performed By: resident  Procedure performed by resident/CRNA in presence of a teaching physician.        Location: OB  Procedure Start/Stop TImes:     patient identified, IV checked, site marked, risks and benefits discussed, informed consent, monitors and equipment checked, pre-op evaluation, at physician/surgeon's request and post-op pain management      Correct Patient: Yes      Correct Position: Yes      Correct Site: Yes      Correct Procedure: Yes      Correct Laterality:  Yes    Site Marked:  Yes  Procedure details:     Procedure:  TAP    Diagnosis:  Post operative pain    Laterality:  Bilateral    Position:  Supine    Sterile Prep: chloraprep, mask and sterile gloves      Local skin infiltration:  None    Needle:  Short bevel    Needle gauge:  21    Needle length (mm):  110    Ultrasound: Yes      Ultrasound used to identify targeted nerve, plexus, or vascular structure and placed a needle adjacent to it      Permanent Image entered into patiient's record      Abnormal pain on injection: No      Blood Aspirated: No      Paresthesias:  No    Bleeding at site: No      Bolus via:  Needle    Infusion Method:  Single Shot    Complications:  None  Assessment/Narrative:     Injection made incrementally with aspirations every (mL):  5

## 2020-04-24 NOTE — PROGRESS NOTES
Transferred to 71 via cart holding  in her arms.  Too present and supportive.  Scheduled repeat c/s. QBL in  with an additional 90 during pacu time.  CHTN.  Took am labetol prior to admission.  Stable.  Comfortable.  Had torodal and tap while in OR.  Breast fed w assistance.  Skin to skin.  Bedside report and id bands checked with LEXA Fong.  Call light is within pt's reach.  Abuse screen not completed.  Urine and cord blood sent.  Lab notified.

## 2020-04-24 NOTE — ANESTHESIA POSTPROCEDURE EVALUATION
Anesthesia POST Procedure Evaluation    Patient: Kandi aPrker   MRN:     [id number] Gender:   female   Age:    34 year old :      1985        Preoperative Diagnosis: Multigravida of advanced maternal age in third trimester [O09.523]  History of  delivery [Z98.891]   Procedure(s):  REPEAT  SECTION   Postop Comments: No value filed.     Anesthesia Type: MAC, Spinal, Peripheral Nerve Block, For Post-op pain in coordination with surgeon       Disposition: Admission   Postop Pain Control: Uneventful            Sign Out: Well controlled pain   PONV: No   Neuro/Psych: Uneventful            Sign Out: Acceptable/Baseline neuro status   Airway/Respiratory: Uneventful            Sign Out: Acceptable/Baseline resp. status   CV/Hemodynamics: Uneventful            Sign Out: Acceptable CV status   Other NRE: NONE   DID A NON-ROUTINE EVENT OCCUR? No         Last Anesthesia Record Vitals:  CRNA VITALS  2020 0926 - 2020 1026      2020             Pulse:  70    Ht Rate:  70    SpO2:  99 %          Last PACU Vitals:  Vitals Value Taken Time   /69 2020 11:30 AM   Temp 36.6  C (97.9  F) 2020 11:00 AM   Pulse     Resp 16 2020 11:30 AM   SpO2 99 % 2020 11:30 AM   Temp src     NIBP     Pulse     SpO2     Resp     Temp     Ht Rate     Temp 2           Electronically Signed By: Capri Anne MD, 2020, 11:46 AM

## 2020-04-25 LAB — HGB BLD-MCNC: 11.1 G/DL (ref 11.7–15.7)

## 2020-04-25 PROCEDURE — 85018 HEMOGLOBIN: CPT | Performed by: STUDENT IN AN ORGANIZED HEALTH CARE EDUCATION/TRAINING PROGRAM

## 2020-04-25 PROCEDURE — 36415 COLL VENOUS BLD VENIPUNCTURE: CPT | Performed by: STUDENT IN AN ORGANIZED HEALTH CARE EDUCATION/TRAINING PROGRAM

## 2020-04-25 PROCEDURE — 12000001 ZZH R&B MED SURG/OB UMMC

## 2020-04-25 PROCEDURE — 25000132 ZZH RX MED GY IP 250 OP 250 PS 637: Performed by: STUDENT IN AN ORGANIZED HEALTH CARE EDUCATION/TRAINING PROGRAM

## 2020-04-25 PROCEDURE — 25000128 H RX IP 250 OP 636: Performed by: STUDENT IN AN ORGANIZED HEALTH CARE EDUCATION/TRAINING PROGRAM

## 2020-04-25 RX ORDER — LABETALOL 100 MG/1
50 TABLET, FILM COATED ORAL 2 TIMES DAILY
Qty: 60 TABLET | Refills: 1 | Status: SHIPPED | OUTPATIENT
Start: 2020-04-25 | End: 2020-04-29

## 2020-04-25 RX ADMIN — KETOROLAC TROMETHAMINE 30 MG: 30 INJECTION, SOLUTION INTRAMUSCULAR at 03:29

## 2020-04-25 RX ADMIN — SIMETHICONE CHEW TAB 80 MG 80 MG: 80 TABLET ORAL at 07:23

## 2020-04-25 RX ADMIN — SIMETHICONE CHEW TAB 80 MG 80 MG: 80 TABLET ORAL at 13:23

## 2020-04-25 RX ADMIN — ACETAMINOPHEN 975 MG: 325 TABLET, FILM COATED ORAL at 07:22

## 2020-04-25 RX ADMIN — Medication 50 MG: at 19:37

## 2020-04-25 RX ADMIN — IBUPROFEN 800 MG: 800 TABLET, FILM COATED ORAL at 22:20

## 2020-04-25 RX ADMIN — Medication 50 MG: at 07:47

## 2020-04-25 RX ADMIN — ACETAMINOPHEN 975 MG: 325 TABLET, FILM COATED ORAL at 13:23

## 2020-04-25 RX ADMIN — IBUPROFEN 800 MG: 800 TABLET, FILM COATED ORAL at 16:23

## 2020-04-25 RX ADMIN — SENNOSIDES AND DOCUSATE SODIUM 1 TABLET: 8.6; 5 TABLET ORAL at 07:48

## 2020-04-25 RX ADMIN — SIMETHICONE CHEW TAB 80 MG 80 MG: 80 TABLET ORAL at 19:37

## 2020-04-25 RX ADMIN — ACETAMINOPHEN 975 MG: 325 TABLET, FILM COATED ORAL at 01:04

## 2020-04-25 RX ADMIN — IBUPROFEN 800 MG: 800 TABLET, FILM COATED ORAL at 10:09

## 2020-04-25 RX ADMIN — ACETAMINOPHEN 975 MG: 325 TABLET, FILM COATED ORAL at 19:36

## 2020-04-25 NOTE — PLAN OF CARE
Data: Vital signs within normal limits. Postpartum checks within normal limits - see flow record. The patient takes BID 50 mg of labetalol for chronic HTN. Patient eating and drinking normally. Patient is able to pass gas. Patient able to empty bladder independently and is up ambulating with stand by assistance. No apparent signs of infection. Incision healing well. Patient performing self cares and is able to care for infant.  Action: Patient medicated during the shift for pain. See MAR. Patient reassessed within 1 hour after each medication and pain was improved - patient stated she was comfortable. Patient education done about breastfeeding, pain medications, treatment plan, emptying her bladder, and normal bleeding pattern. See flow record.  Response: Positive attachment behaviors observed with infant. Support person present.   Plan: Will continue plan of care.

## 2020-04-25 NOTE — PLAN OF CARE
Vss, postpartum assessment WDL. Taking tylenol and ibuprofen for pain control. Breast feeding infant independently. Ambulating in room. Tolerating regular diet. Continue with plan of care.

## 2020-04-25 NOTE — PROVIDER NOTIFICATION
04/24/20 2016   Provider Notification   Provider Name/Title Wise    Method of Notification Electronic Page   Request Evaluate-Remote   Notification Reason Medication Request   The patient takes 50mg at home of Labetalol instead of 100mg. Could you change  the order for her labetalol?

## 2020-04-25 NOTE — PLAN OF CARE
Patient's blood pressure slightly elevated (130's/80's). Patient denies any signs or symptoms of pre-e at this time. Patient's pain has been well controlled with scheduled toradol and tylenol. Patient up ad tiffany and independent with cares of baby. Breastfeeding well. Small reddened/bruised area on left nipple. Discussed deeper latch and positioning. NO concerns at this time.

## 2020-04-25 NOTE — PROGRESS NOTES
Post Partum Progress Note  PPD#1    Subjective:  Patiet reports her abdominal pain is ok.  She is having some gas pains.  She feels that ibuprofen is helping the cramping pain.  Lochia is less than a menses.  She is ambulating without dizziness, tolerating regular diet without nausea or vomiting, voiding without issues.  She has had a bowel movement.  She plans breastfeeding.  She denies any headache, changes in vision, chest pain, chest pressure.      Objective:  Patient Vitals for the past 24 hrs:   BP Temp Temp src Pulse Heart Rate Resp SpO2   20 1000 129/82 98.2  F (36.8  C) Oral -- 86 16 100 %   20 0545 139/84 98.3  F (36.8  C) Oral -- -- 18 100 %   20 0111 133/88 97.9  F (36.6  C) Oral 87 -- -- 100 %   20 2100 136/87 98.2  F (36.8  C) Oral -- 87 16 100 %   20 1714 122/68 98.4  F (36.9  C) Oral -- 78 16 99 %   20 1626 122/84 98.3  F (36.8  C) Oral -- 81 16 99 %   20 1515 127/76 98.2  F (36.8  C) Oral -- 75 16 100 %   20 1415 120/69 98.2  F (36.8  C) Oral -- 81 16 100 %   20 1316 124/70 98.1  F (36.7  C) Oral -- 68 16 100 %   20 1248 129/72 98  F (36.7  C) Oral -- 74 16 98 %   20 1215 125/75 98.3  F (36.8  C) Oral -- 69 16 98 %   20 1145 113/67 -- -- -- -- 16 99 %   20 1130 121/69 -- -- -- -- 16 99 %   20 1115 113/69 -- -- -- -- 16 99 %   ]    General: NAD. A&Ox3.  CV: Regular rate, well perfused.   Pulm: Normal respiratory effort.  Abd: Soft, appropriately tender to palpation, non-distended. Fundus is firm and 1 cm below the umbilicus.    Incision: pfannenstiel incision is clean, dry, intact with dermabond in place  Ext:  lower extremity edema bilaterally. No calf tenderness.    Assessment/Plan:  Kandi Parker is a 34 year old  female who is POD#1 s/p RLTCS, currently recovering well from surgery.     - Encourage routine post-operative goals including ambulation and incentive spirometry  - PNC: Rh positive. Rubella  immune. No intervention indicated.  - Pain: controlled on oral medications  - Heme: Hgb 11.8>>11.1.  If <10 will discharge home with iron.  - GI: continue anti-emetics and stool softeners as needed.  - : Voiding spontaneously.  - Infant: Stable in room  - Feeding: Plans on breastfeeding.    Chronic hypertension  - continue labetalol 50 mg BID  - BPs wnl overnight    Discharge to home on POD#2-3.    Elena Mabry MD

## 2020-04-26 VITALS
RESPIRATION RATE: 16 BRPM | OXYGEN SATURATION: 100 % | HEART RATE: 87 BPM | TEMPERATURE: 98.2 F | SYSTOLIC BLOOD PRESSURE: 135 MMHG | DIASTOLIC BLOOD PRESSURE: 75 MMHG

## 2020-04-26 PROCEDURE — 25000132 ZZH RX MED GY IP 250 OP 250 PS 637: Performed by: STUDENT IN AN ORGANIZED HEALTH CARE EDUCATION/TRAINING PROGRAM

## 2020-04-26 RX ORDER — OXYCODONE HYDROCHLORIDE 5 MG/1
5 TABLET ORAL EVERY 6 HOURS PRN
Qty: 10 TABLET | Refills: 0 | Status: SHIPPED | OUTPATIENT
Start: 2020-04-26 | End: 2020-06-04

## 2020-04-26 RX ADMIN — IBUPROFEN 800 MG: 800 TABLET, FILM COATED ORAL at 11:00

## 2020-04-26 RX ADMIN — SIMETHICONE CHEW TAB 80 MG 80 MG: 80 TABLET ORAL at 01:39

## 2020-04-26 RX ADMIN — ACETAMINOPHEN 975 MG: 325 TABLET, FILM COATED ORAL at 01:38

## 2020-04-26 RX ADMIN — SIMETHICONE CHEW TAB 80 MG 80 MG: 80 TABLET ORAL at 07:28

## 2020-04-26 RX ADMIN — IBUPROFEN 800 MG: 800 TABLET, FILM COATED ORAL at 04:46

## 2020-04-26 RX ADMIN — Medication 50 MG: at 07:28

## 2020-04-26 RX ADMIN — ACETAMINOPHEN 975 MG: 325 TABLET, FILM COATED ORAL at 07:28

## 2020-04-26 NOTE — PLAN OF CARE
Vss, postpartum assessment WDL. Breast feeding infant independently. Taking tylenol and ibuprofen for pain control. Hydrogel pads given for sore nipples with instructions for use. Discharge and home med instructions discussed with patient, all questions answered. Patient knows to follow up in one week for BP evaluation if her BP is elevated at home. Patient also knows to make a 6 week follow up appt with her provider. Patient was discharged home at 1105.

## 2020-04-26 NOTE — PROGRESS NOTES
Post Partum Progress Note  PPD#2    Subjective:  She is resting comfortably in bed this morning. She complains of mild pain. Pain is improving and well controlled on current medication regimen. She is tolerating PO intake. Lochia present and light.  She is voiding without difficulty. She has  passed flatus and has had a BM. She is ambulating without dizziness or difficulty.  She denies headache, changes in vision, nausea/vomiting, chest pain, shortness of breath, RUQ pain, or worsening edema.  Plans to breast feed.      Objective:  Patient Vitals for the past 24 hrs:   BP Temp Temp src Heart Rate Resp SpO2   20 0145 138/76 97.7  F (36.5  C) Oral 85 16 --   20 2000 139/74 98.4  F (36.9  C) Oral 87 16 --   20 1622 124/84 98.2  F (36.8  C) Oral 86 16 --   20 1000 129/82 98.2  F (36.8  C) Oral 86 16 100 %   General: NAD. A&Ox3.  CV: Regular rate, well perfused.   Pulm: Normal respiratory effort.  Abd: Soft, appropriately tender to palpation, non-distended. Fundus is firm and 1 cm below the umbilicus.    Incision: pfannenstiel incision is clean, dry, intact with dermabond in place  Ext:  lower extremity edema bilaterally. No calf tenderness.    Assessment/Plan:  Kandi Parker is a 34 year old  female who is POD# s/p RLTCS, currently recovering well from surgery.     - Encourage routine post-operative goals including ambulation and incentive spirometry  - PNC: Rh positive. Rubella immune. No intervention indicated.  - Pain: controlled on oral medications  - Heme: Hgb 11.8>>11.1.  - GI: continue anti-emetics and stool softeners as needed.  - : Voiding spontaneously.  - Infant: Stable in room  - Feeding: Plans on breastfeeding.  - Contraception: Condoms    Chronic hypertension  - continue labetalol 50 mg BID  - BPs wnl overnight    Discharge to home     Yudelka Xiong MD   OB/GYN Resident PGY-3  2020 8:17 AM      Physician Attestation   CHANCE, Tiff Joseph, personally saw and  evaluated Kandi Parker.  I have reviewed the above note and agree with details and plan for discharge. She has not taken any oxycodone yet,  but would like a paper prescription to take home with her just incase she needs it at  Home.  BP's have been well controlled on postpartum and she will continue to take her labetalol 50 mg BID. At home.   Discussed postpartum instructions/restrictions and follow up. Call with any concerns.   Tiff Joseph MD  April 26, 2020

## 2020-04-26 NOTE — PLAN OF CARE
Home care referral placed through [facility] for Maternal early discharge.

## 2020-04-26 NOTE — DISCHARGE INSTRUCTIONS
Postop  Birth Instructions    Activity       Do not lift more than 10 pounds for 6 weeks after surgery.  Ask family and friends for help when you need it.    No driving until you have stopped taking your pain medications (usually two weeks after surgery).    No heavy exercise or activity for 6 weeks.  Don't do anything that will put a strain on your surgery site.    Don't strain when using the toilet.  Your care team may prescribe a stool softener if you have problems with your bowel movements.     To care for your incision:       Keep the incision clean and dry.    Do not soak your incision in water. No swimming or hot tubs until it has fully healed. You may soak in the bathtub if the water level is below your incision.    Do not use peroxide, gel, cream, lotion, or ointment on your incision.    Adjust your clothes to avoid pressure on your surgery site (check the elastic in your underwear for example).     You may see a small amount of clear or pink drainage and this is normal.  Check with your health care provider:       If the drainage increases or has an odor.    If the incision reddens, you have swelling, or develop a rash.    If you have increased pain and the medicine we prescribed doesn't help.    If you have a fever above 100.4 F (38 C) with or without chills when placing thermometer under your tongue.   The area around your incision (surgery wound), will feel numb.  This is normal. The numbness should go away in less than a year.     Keep your hands clean:  Always wash your hands before touching your incision (surgery wound). This helps reduce your risk of infection. If your hands aren't dirty, you may use an alcohol hand-rub to clean your hands. Keep your nails clean and short.    Call your healthcare provider if you have any of these symptoms:       You soak a sanitary pad with blood within 1 hour, or you see blood clots larger than a golf ball.    Bleeding that lasts more than 6  weeks.    Vaginal discharge that smells bad.    Severe pain, cramping or tenderness in your lower belly area.    A need to urinate more frequently (use the toilet more often), more urgently (use the toilet very quickly), or it burns when you urinate.    Nausea and vomiting.    Redness, swelling or pain around a vein in your leg.    Problems breastfeeding or a red or painful area on your breast.    Chest pain and cough or are gasping for air.    Problems with coping with sadness, anxiety or depression. If you have concerns about hurting yourself or the baby, call your provider immediately.      You have questions or concerns after you return home.                After a   It can take time to recover fully after a . It s important to take care of yourself--both for your own sake and because your new baby needs you.  Incision care  Tips for taking care of your incision include:    You will probably be able to shower and pat the incision dry.    Watch your incision for signs of infection. These include redness that gets worse or fluid draining from it.    Hold a pillow against the incision when you get up from a lying or sitting position. Also do this when you laugh or cough.    Avoid heavy lifting. Don t lift anything heavier than your baby until your healthcare provider tells you otherwise.  When to call your healthcare provider  Call your healthcare provider if you have:    A fever of 100.4 F (38.0 C) or higher    Redness, pain, or discharge at the incision site that gets worse    Vaginal bleeding that soaks through a pad per hour or large blood clots    Severe pain in your stomach    No bowel movement within 1 week after the birth of your baby    Vaginal discharge that has a foul odor    Swollen, red, and painful area in the leg    Burning when urinating or blood in the urine    A rash or hives    Sore, red, painful area on the breasts (may also have flu-like symptoms)    Feelings of anxiety, panic,  and depression, or difficulty bonding with your baby   Date Last Reviewed: 12/1/2017 2000-2019 [employer]. [address] [place] [address], [place] [address]. All rights reserved. This information is not intended as a substitute for professional medical care. Always follow your healthcare professional's instructions.

## 2020-04-27 ENCOUNTER — TELEPHONE (OUTPATIENT)
Dept: OBGYN | Facility: CLINIC | Age: 35
End: 2020-04-27

## 2020-04-27 NOTE — TELEPHONE ENCOUNTER
Does she have a a home cuff?  If not, can we please get her a prescription?    I would advise increasing to 100mg BID with daily BP checks, call if >160/100.      When does she have a visit scheduled?    Thanks    VIDHYA KOENIG MD

## 2020-04-27 NOTE — TELEPHONE ENCOUNTER
Pt called as she hasn't heard from anyone. Routing to on-call provider. Can you please advise? Thanks.   Nelia Eduardo RN-BSN

## 2020-04-27 NOTE — TELEPHONE ENCOUNTER
Pt called to clarify message she received on voicemail. Pt states that her anxiety is a bit high right now. Took BP about a half hour ago and it was 163/90 and took 5 minutes ago and it was 160/85. Discussed with patient increasing medication to 100mg BID, continue to take daily BP, let us know what her BP is at the end of the week, and to call if BP over 160/90 so we can get her in for appointment. Pt states understanding. She will increase the medication and continue to monitor. Advised to call if any other symptoms - visual changes, light headedness/dizziness, headache, shortness of breath.   Nelia Eduardo, RN-BSN

## 2020-04-27 NOTE — TELEPHONE ENCOUNTER
If she can email or call us with her BP at the end of this week if ,160/90, right away if above that, we can determine when she should have visit.  Thanks    VIDHYA KOENIG MD

## 2020-04-27 NOTE — TELEPHONE ENCOUNTER
Pt does not have any clinic visits scheduled.  When do you want to see her?      LMTC.  Ene Hernandez RN

## 2020-04-27 NOTE — TELEPHONE ENCOUNTER
[facility] RN saw pt today.  She delivered 4/24, hx of HTN.  On 50my labetalol BID.  BP today was 158/88 and 160/90.  Only other symptoms are swelling in feet and ankles.  Previously pt had to increase labetolol dose PP.  Do you want her to continue with same dose or change?  How often do you want her to monitor BP?  Ene Hernandez, RN

## 2020-04-28 NOTE — TELEPHONE ENCOUNTER
Kandi Parker is a 34 year old  who is 4 days post op status post repeat CS on . She has chronic hypertension and is taking labetalol 100mg BID. Most recent /100. Recommend she increase labetalol to 200mg BID, continue checking BP and monitoring for symptoms and come in for clinic appointment this week.  Thanks,  Rosa Reno MD

## 2020-04-28 NOTE — TELEPHONE ENCOUNTER
Patient increased her Labetalol to 100 mg bid as recommended starting last night. She has been checking her blood pressures throughout the day. They have been ranging 152/91, 143/88, and most recent one 160/100. Denies any visual disturbances, headaches, epigastric pain. What would you recommend? Nelia Anne RN

## 2020-04-28 NOTE — TELEPHONE ENCOUNTER
Spoke with patient. She will plan on increasing labetalol to 200 BID, and will continue to monitor blood pressure and symptoms at home. Appointment scheduled on Thursday 4/30 with Dr. Reno for blood pressure check. Patient will call clinic if any concerns arise before that. Nelia Anne RN

## 2020-04-29 ENCOUNTER — TELEPHONE (OUTPATIENT)
Dept: OBGYN | Facility: CLINIC | Age: 35
End: 2020-04-29

## 2020-04-29 ENCOUNTER — HOSPITAL ENCOUNTER (INPATIENT)
Facility: CLINIC | Age: 35
LOS: 2 days | Discharge: HOME | DRG: 776 | End: 2020-05-01
Attending: FAMILY MEDICINE | Admitting: OBSTETRICS & GYNECOLOGY
Payer: COMMERCIAL

## 2020-04-29 DIAGNOSIS — F41.9 ANXIETY: Primary | ICD-10-CM

## 2020-04-29 LAB
ALBUMIN SERPL-MCNC: 2.8 G/DL (ref 3.4–5)
ALBUMIN UR-MCNC: NEGATIVE MG/DL
ALP SERPL-CCNC: 121 U/L (ref 40–150)
ALT SERPL W P-5'-P-CCNC: 37 U/L (ref 0–50)
ANION GAP SERPL CALCULATED.3IONS-SCNC: 6 MMOL/L (ref 3–14)
APPEARANCE UR: CLEAR
AST SERPL W P-5'-P-CCNC: 24 U/L (ref 0–45)
BACTERIA #/AREA URNS HPF: ABNORMAL /HPF
BASOPHILS # BLD AUTO: 0 10E9/L (ref 0–0.2)
BASOPHILS NFR BLD AUTO: 0.3 %
BILIRUB SERPL-MCNC: 0.5 MG/DL (ref 0.2–1.3)
BILIRUB UR QL STRIP: NEGATIVE
BUN SERPL-MCNC: 11 MG/DL (ref 7–30)
CALCIUM SERPL-MCNC: 9 MG/DL (ref 8.5–10.1)
CHLORIDE SERPL-SCNC: 107 MMOL/L (ref 94–109)
CO2 SERPL-SCNC: 26 MMOL/L (ref 20–32)
COLOR UR AUTO: ABNORMAL
CREAT SERPL-MCNC: 0.57 MG/DL (ref 0.52–1.04)
DIFFERENTIAL METHOD BLD: ABNORMAL
EOSINOPHIL # BLD AUTO: 0.2 10E9/L (ref 0–0.7)
EOSINOPHIL NFR BLD AUTO: 2.2 %
ERYTHROCYTE [DISTWIDTH] IN BLOOD BY AUTOMATED COUNT: 13.8 % (ref 10–15)
GFR SERPL CREATININE-BSD FRML MDRD: >90 ML/MIN/{1.73_M2}
GLUCOSE SERPL-MCNC: 79 MG/DL (ref 70–99)
GLUCOSE UR STRIP-MCNC: NEGATIVE MG/DL
HCT VFR BLD AUTO: 34.2 % (ref 35–47)
HGB BLD-MCNC: 11.1 G/DL (ref 11.7–15.7)
HGB UR QL STRIP: ABNORMAL
IMM GRANULOCYTES # BLD: 0.2 10E9/L (ref 0–0.4)
IMM GRANULOCYTES NFR BLD: 1.4 %
KETONES UR STRIP-MCNC: NEGATIVE MG/DL
LEUKOCYTE ESTERASE UR QL STRIP: NEGATIVE
LYMPHOCYTES # BLD AUTO: 2.8 10E9/L (ref 0.8–5.3)
LYMPHOCYTES NFR BLD AUTO: 27.3 %
MCH RBC QN AUTO: 30.5 PG (ref 26.5–33)
MCHC RBC AUTO-ENTMCNC: 32.5 G/DL (ref 31.5–36.5)
MCV RBC AUTO: 94 FL (ref 78–100)
MONOCYTES # BLD AUTO: 0.7 10E9/L (ref 0–1.3)
MONOCYTES NFR BLD AUTO: 7 %
MUCOUS THREADS #/AREA URNS LPF: PRESENT /LPF
NEUTROPHILS # BLD AUTO: 6.4 10E9/L (ref 1.6–8.3)
NEUTROPHILS NFR BLD AUTO: 61.8 %
NITRATE UR QL: NEGATIVE
NRBC # BLD AUTO: 0 10*3/UL
NRBC BLD AUTO-RTO: 0 /100
PH UR STRIP: 5.5 PH (ref 5–7)
PLATELET # BLD AUTO: 319 10E9/L (ref 150–450)
POTASSIUM SERPL-SCNC: 3.9 MMOL/L (ref 3.4–5.3)
PROT SERPL-MCNC: 7.1 G/DL (ref 6.8–8.8)
RBC # BLD AUTO: 3.64 10E12/L (ref 3.8–5.2)
RBC #/AREA URNS AUTO: 2 /HPF (ref 0–2)
SODIUM SERPL-SCNC: 139 MMOL/L (ref 133–144)
SOURCE: ABNORMAL
SP GR UR STRIP: 1 (ref 1–1.03)
SQUAMOUS #/AREA URNS AUTO: 1 /HPF (ref 0–1)
UROBILINOGEN UR STRIP-MCNC: NORMAL MG/DL (ref 0–2)
WBC # BLD AUTO: 10.4 10E9/L (ref 4–11)
WBC #/AREA URNS AUTO: <1 /HPF (ref 0–5)

## 2020-04-29 PROCEDURE — 25000132 ZZH RX MED GY IP 250 OP 250 PS 637: Performed by: STUDENT IN AN ORGANIZED HEALTH CARE EDUCATION/TRAINING PROGRAM

## 2020-04-29 PROCEDURE — 81001 URINALYSIS AUTO W/SCOPE: CPT | Performed by: FAMILY MEDICINE

## 2020-04-29 PROCEDURE — 99285 EMERGENCY DEPT VISIT HI MDM: CPT | Mod: 25 | Performed by: FAMILY MEDICINE

## 2020-04-29 PROCEDURE — 80053 COMPREHEN METABOLIC PANEL: CPT | Performed by: FAMILY MEDICINE

## 2020-04-29 PROCEDURE — 85025 COMPLETE CBC W/AUTO DIFF WBC: CPT | Performed by: FAMILY MEDICINE

## 2020-04-29 PROCEDURE — 25000128 H RX IP 250 OP 636: Performed by: STUDENT IN AN ORGANIZED HEALTH CARE EDUCATION/TRAINING PROGRAM

## 2020-04-29 PROCEDURE — 12000001 ZZH R&B MED SURG/OB UMMC

## 2020-04-29 PROCEDURE — 25800030 ZZH RX IP 258 OP 636: Performed by: STUDENT IN AN ORGANIZED HEALTH CARE EDUCATION/TRAINING PROGRAM

## 2020-04-29 PROCEDURE — 96374 THER/PROPH/DIAG INJ IV PUSH: CPT | Performed by: FAMILY MEDICINE

## 2020-04-29 PROCEDURE — 25000128 H RX IP 250 OP 636: Performed by: FAMILY MEDICINE

## 2020-04-29 PROCEDURE — 99283 EMERGENCY DEPT VISIT LOW MDM: CPT | Mod: Z6 | Performed by: FAMILY MEDICINE

## 2020-04-29 RX ORDER — IBUPROFEN 600 MG/1
600 TABLET, FILM COATED ORAL EVERY 6 HOURS
Status: DISCONTINUED | OUTPATIENT
Start: 2020-04-29 | End: 2020-05-01 | Stop reason: HOSPADM

## 2020-04-29 RX ORDER — MAGNESIUM SULFATE HEPTAHYDRATE 500 MG/ML
4 INJECTION, SOLUTION INTRAMUSCULAR; INTRAVENOUS
Status: DISCONTINUED | OUTPATIENT
Start: 2020-04-29 | End: 2020-05-01 | Stop reason: HOSPADM

## 2020-04-29 RX ORDER — LABETALOL 100 MG/1
200 TABLET, FILM COATED ORAL 2 TIMES DAILY
Status: ON HOLD | COMMUNITY
End: 2020-05-01

## 2020-04-29 RX ORDER — LABETALOL 20 MG/4 ML (5 MG/ML) INTRAVENOUS SYRINGE
10 ONCE
Status: COMPLETED | OUTPATIENT
Start: 2020-04-29 | End: 2020-04-29

## 2020-04-29 RX ORDER — LABETALOL 20 MG/4 ML (5 MG/ML) INTRAVENOUS SYRINGE
20 ONCE
Status: DISCONTINUED | OUTPATIENT
Start: 2020-04-29 | End: 2020-04-29

## 2020-04-29 RX ORDER — MAGNESIUM SULFATE HEPTAHYDRATE 40 MG/ML
4 INJECTION, SOLUTION INTRAVENOUS ONCE
Status: COMPLETED | OUTPATIENT
Start: 2020-04-29 | End: 2020-04-29

## 2020-04-29 RX ORDER — AMOXICILLIN 250 MG
1 CAPSULE ORAL DAILY PRN
Status: CANCELLED | OUTPATIENT
Start: 2020-04-29

## 2020-04-29 RX ORDER — LABETALOL 20 MG/4 ML (5 MG/ML) INTRAVENOUS SYRINGE
80 EVERY 10 MIN PRN
Status: DISCONTINUED | OUTPATIENT
Start: 2020-04-29 | End: 2020-05-01 | Stop reason: HOSPADM

## 2020-04-29 RX ORDER — SODIUM CHLORIDE, SODIUM LACTATE, POTASSIUM CHLORIDE, CALCIUM CHLORIDE 600; 310; 30; 20 MG/100ML; MG/100ML; MG/100ML; MG/100ML
10-125 INJECTION, SOLUTION INTRAVENOUS CONTINUOUS
Status: DISCONTINUED | OUTPATIENT
Start: 2020-04-29 | End: 2020-05-01 | Stop reason: HOSPADM

## 2020-04-29 RX ORDER — NALOXONE HYDROCHLORIDE 0.4 MG/ML
.1-.4 INJECTION, SOLUTION INTRAMUSCULAR; INTRAVENOUS; SUBCUTANEOUS
Status: DISCONTINUED | OUTPATIENT
Start: 2020-04-29 | End: 2020-05-01 | Stop reason: HOSPADM

## 2020-04-29 RX ORDER — LORAZEPAM 2 MG/ML
2 INJECTION INTRAMUSCULAR
Status: DISCONTINUED | OUTPATIENT
Start: 2020-04-29 | End: 2020-05-01 | Stop reason: HOSPADM

## 2020-04-29 RX ORDER — LABETALOL 20 MG/4 ML (5 MG/ML) INTRAVENOUS SYRINGE
40 EVERY 10 MIN PRN
Status: DISCONTINUED | OUTPATIENT
Start: 2020-04-29 | End: 2020-05-01 | Stop reason: HOSPADM

## 2020-04-29 RX ORDER — OXYCODONE HYDROCHLORIDE 5 MG/1
5 TABLET ORAL EVERY 6 HOURS PRN
Status: CANCELLED | OUTPATIENT
Start: 2020-04-29

## 2020-04-29 RX ORDER — OXYCODONE HYDROCHLORIDE 5 MG/1
5 TABLET ORAL EVERY 4 HOURS PRN
Status: DISCONTINUED | OUTPATIENT
Start: 2020-04-29 | End: 2020-05-01 | Stop reason: HOSPADM

## 2020-04-29 RX ORDER — MAGNESIUM SULFATE HEPTAHYDRATE 40 MG/ML
4 INJECTION, SOLUTION INTRAVENOUS
Status: DISCONTINUED | OUTPATIENT
Start: 2020-04-29 | End: 2020-05-01 | Stop reason: HOSPADM

## 2020-04-29 RX ORDER — NIFEDIPINE 10 MG/1
10 CAPSULE ORAL
Status: DISCONTINUED | OUTPATIENT
Start: 2020-04-29 | End: 2020-05-01 | Stop reason: HOSPADM

## 2020-04-29 RX ORDER — LABETALOL 300 MG/1
300 TABLET, FILM COATED ORAL EVERY 8 HOURS SCHEDULED
Status: CANCELLED | OUTPATIENT
Start: 2020-04-29

## 2020-04-29 RX ORDER — ACETAMINOPHEN 325 MG/1
650 TABLET ORAL EVERY 6 HOURS
Status: DISCONTINUED | OUTPATIENT
Start: 2020-04-29 | End: 2020-05-01 | Stop reason: HOSPADM

## 2020-04-29 RX ORDER — MAGNESIUM SULFATE HEPTAHYDRATE 40 MG/ML
2 INJECTION, SOLUTION INTRAVENOUS
Status: DISCONTINUED | OUTPATIENT
Start: 2020-04-29 | End: 2020-05-01 | Stop reason: HOSPADM

## 2020-04-29 RX ORDER — LIDOCAINE 40 MG/G
CREAM TOPICAL
Status: DISCONTINUED | OUTPATIENT
Start: 2020-04-29 | End: 2020-05-01 | Stop reason: HOSPADM

## 2020-04-29 RX ORDER — LABETALOL 200 MG/1
400 TABLET, FILM COATED ORAL EVERY 8 HOURS SCHEDULED
Status: DISCONTINUED | OUTPATIENT
Start: 2020-04-29 | End: 2020-05-01 | Stop reason: HOSPADM

## 2020-04-29 RX ORDER — LABETALOL 20 MG/4 ML (5 MG/ML) INTRAVENOUS SYRINGE
20 EVERY 10 MIN PRN
Status: DISCONTINUED | OUTPATIENT
Start: 2020-04-29 | End: 2020-05-01 | Stop reason: HOSPADM

## 2020-04-29 RX ORDER — CALCIUM GLUCONATE 94 MG/ML
1 INJECTION, SOLUTION INTRAVENOUS
Status: DISCONTINUED | OUTPATIENT
Start: 2020-04-29 | End: 2020-05-01 | Stop reason: HOSPADM

## 2020-04-29 RX ORDER — IBUPROFEN 600 MG/1
600 TABLET, FILM COATED ORAL EVERY 6 HOURS PRN
Status: CANCELLED | OUTPATIENT
Start: 2020-04-29

## 2020-04-29 RX ORDER — MAGNESIUM SULFATE IN WATER 40 MG/ML
1.5 INJECTION, SOLUTION INTRAVENOUS CONTINUOUS
Status: DISPENSED | OUTPATIENT
Start: 2020-04-29 | End: 2020-04-30

## 2020-04-29 RX ADMIN — MAGNESIUM SULFATE IN WATER 4 G: 40 INJECTION, SOLUTION INTRAVENOUS at 18:56

## 2020-04-29 RX ADMIN — SODIUM CHLORIDE, POTASSIUM CHLORIDE, SODIUM LACTATE AND CALCIUM CHLORIDE 75 ML/HR: 600; 310; 30; 20 INJECTION, SOLUTION INTRAVENOUS at 18:57

## 2020-04-29 RX ADMIN — LABETALOL 20 MG/4 ML (5 MG/ML) INTRAVENOUS SYRINGE 10 MG: at 16:02

## 2020-04-29 RX ADMIN — IBUPROFEN 600 MG: 600 TABLET ORAL at 20:10

## 2020-04-29 RX ADMIN — LABETALOL HCL 400 MG: 200 TABLET, FILM COATED ORAL at 22:03

## 2020-04-29 RX ADMIN — MAGNESIUM SULFATE HEPTAHYDRATE 2 G/HR: 40 INJECTION, SOLUTION INTRAVENOUS at 19:45

## 2020-04-29 RX ADMIN — ACETAMINOPHEN 650 MG: 325 TABLET, FILM COATED ORAL at 20:10

## 2020-04-29 ASSESSMENT — ENCOUNTER SYMPTOMS: HYPERTENSION: 1

## 2020-04-29 NOTE — H&P
OB/GYN History and Physical   2020  Kandi Parker  [id number]      HPI: Kandi Parker is a 34 year old  POD#5 s/p RLTCS who presents from home with elevated BP. Patient has known cHTN and was taking labetalol 50mg BID throughout pregnancy. Postpartum, he labetalol has been up-titrated, most recently to 200mg BID. Last night she had BP with systolic in 160s and also has noticed diastolic readings of >100.     She reports a mild headache this morning, now resolved. Denies vision changes, CP, SOB, epigastric pain. Reports right side pain but feels this is musculoskeletal.     Breastfeeding is going well. Pain is adequately controlled. Lochia is lessening. Ambulating with out dizziness. No bowel/bladder difficulties.    OBHX:   OB History    Para Term  AB Living   2 2 2 0 0 2   SAB TAB Ectopic Multiple Live Births   0 0 0 0 2      # Outcome Date GA Lbr Ranjit/2nd Weight Sex Delivery Anes PTL Lv   2 Term 20 38w3d  3.52 kg (7 lb 12.2 oz) F CS-LTranv Spinal N MICHI      Name: CONSTANZAFEMALE-KANDI      Apgar1: 9  Apgar5: 9   1 Term 17 39w1d  3.379 kg (7 lb 7.2 oz) M -SEC Spinal N MICHI       MedicalHX:   Past Medical History:   Diagnosis Date     Hypertension        SurgicalHX:   Past Surgical History:   Procedure Laterality Date      SECTION  2017      SECTION N/A 2020    Procedure: REPEAT  SECTION;  Surgeon: Carmen Medina MD;  Location:  L[facility]D       Medications:   acetaminophen (TYLENOL) 325 MG tablet, Take 2 tablets (650 mg) by mouth every 6 hours as needed for mild pain Start after Delivery.  ibuprofen (ADVIL/MOTRIN) 600 MG tablet, Take 1 tablet (600 mg) by mouth every 6 hours as needed for moderate pain Start after delivery  labetalol (NORMODYNE) 100 MG tablet, Take 200 mg by mouth 2 times daily  Prenatal MV-Min-Fe Fum-FA-DHA (PRENATAL+DHA PO), Take 1 tablet by mouth At Bedtime   oxyCODONE (ROXICODONE) 5 MG tablet, Take 1  tablet (5 mg) by mouth every 6 hours as needed for pain      Allergies:  No Known Allergies    FamilyHX:  Family History   Problem Relation Age of Onset     Hypertension Mother      Diabetes Type 2  Paternal Grandmother      Colon Cancer Maternal Grandmother        SocialHX: Lives with spouse, 3 year old daughter, and . No tobacco use.    ROS: 10-point ROS negative except as indicated in HPI.    Physical Exam:  Patient Vitals for the past 8 hrs:   BP Temp Temp src Pulse Heart Rate Resp SpO2 Weight   20 1725 130/79 -- -- 67 82 18 96 % --   20 1655 125/80 -- -- 72 76 12 97 % --   20 1640 138/83 -- -- 67 79 17 98 % --   20 1625 (!) 142/83 -- -- 74 83 20 97 % --   20 1610 138/87 -- -- 77 86 21 96 % --   20 1601 (!) 143/87 -- -- -- 77 28 98 % --   20 1447 (!) 163/98 97.9  F (36.6  C) Oral 76 -- 18 99 % 82.2 kg (181 lb 4.8 oz)   General: alert, well-appearing,   CV: regular rate and rhythm, well perfused  Lungs: clear bilaterally, no crackles or wheezes  Abdomen: soft, nondistended, nontender. Fundus firm below umbilicus.   Incision: Covered with surgical glue. Mild ecchymoses around incision. No drainage.  Extremities: bilateral lower extremities non-tender without edema  Neuro: 2+ reflexes at patellar and biceps tendons, no clonus b/l    Labs:     Sodium 139   Potassium 3.9   Chloride 107   Carbon Dioxide 26   Urea Nitrogen 11   Creatinine 0.57   GFR Estimate >90   GFR Estimate If Black >90   Calcium 9.0   Anion Gap 6   Albumin 2.8 (L)   Protein Total 7.1   Bilirubin Total 0.5   Alkaline Phosphatase 121   ALT 37   AST 24   Glucose 79   WBC 10.4   Hemoglobin 11.1 (L)   Hematocrit 34.2 (L)   Platelet Count 319   RBC Count 3.64 (L)   MCV 94   MCH 30.5   MCHC 32.5   RDW 13.8       Assessment/Plan: 34 year old  POD#5 s/p RLTCS with medical history notable for chronic hypertension who presents to the ED due to severely elevated BP at home despite up-titration of  labetalol. On arrival to the ED patient had sustained severe range blood pressures with systolic >160 and thus meets criteria for superimposed preeclampsia with severe features. She is asymptomatic, BP currently mild range following administration of 10mg IV labetalol. Will admit for management of postpartum SI preE with SF.    Superimposed pre-eclampsia with SF:  -  Blood pressures: Mild range at present, continue frequent BP monitoring. Notify MD with 160/110 or greater. PRN labetalol or hydralazine for sustained severe range blood pressures.   - Increase home labetalol to 400mg TID (previously 200mg BID)  - Magnesium: 4g>2g/hr. Continue for 24 hours  - Symptoms: None currently  - Strict I/O's, Daily weights  - HELLP labs: Normal, repeat as clinically indicated. UPC previously elevated at 0.32 on 4/24 (baseline too low to detect)    Routine post-partum cares  - Pain: ibuprofen, tylenol, oxycodone  - Breastfeeding    Patient staffed with Dr. Isabella Baker MD  Ob/Gyn PGY-2  April 29, 2020 5:56 PM           Physician Attestation   I, Mari Mason MD, personally examined and evaluated this patient.  I discussed the patient with the resident/fellow and care team, and agree with the assessment and plan of care as documented in the note of 4/29/20.      I personally reviewed vital signs, medications, labs and exam.    Key findings: Severe range blood pressures, responded well to IV labetalol. Meeting criteria for pre-eclampsia with severe features. Recommended magnesium x 24 hours and increase in oral labetalol.   Mari Mason MD  Date of Service (when I saw the patient): 04/29/20

## 2020-04-29 NOTE — PHARMACY-ADMISSION MEDICATION HISTORY
Admission medication history for the April 29, 2020 admission is complete.     Interview sources:  Patient, all medications verified via pharmacy and chart review    Reliability of source: Excellent- knew medications and doses    Medication compliance: Excellent    Current Outpatient Pharmacy: [facility]    Changes made to PTA medication list (reason)  Added: none  Deleted: ferrous sulfate 325 mg tab take 1 tab PO every day w/ breakfast (per pt, not taking)  - senna docusate 8.6-50 mg tab take 1 tab PO every day after delivery (per pt, not taking)  Changed: labetalol 100 mg tab take 0.5 mg tabs PO BID --> labetalol 100 mg tab take 200 mg PO BID (per pt and 4/28/2020 telephone encounter via chart review)    Additional medication history information:   - Pt is an excellent historian of her medications, she recently increased labetalol per MD for rising BPs     Prior to Admission Medication List:  Prior to Admission Medications   Prescriptions Last Dose Informant Taking?   Prenatal MV-Min-Fe Fum-FA-DHA (PRENATAL+DHA PO) 4/28/2020 Pharmacy Yes   Sig: Take 1 tablet by mouth At Bedtime    acetaminophen (TYLENOL) 325 MG tablet 4/29/2020 Pharmacy Yes   Sig: Take 2 tablets (650 mg) by mouth every 6 hours as needed for mild pain Start after Delivery.   ibuprofen (ADVIL/MOTRIN) 600 MG tablet 4/29/2020 Pharmacy Yes   Sig: Take 1 tablet (600 mg) by mouth every 6 hours as needed for moderate pain Start after delivery   labetalol (NORMODYNE) 100 MG tablet 4/29/2020 Other Yes   Sig: Take 200 mg by mouth 2 times daily   oxyCODONE (ROXICODONE) 5 MG tablet hasn't needed Pharmacy No   Sig: Take 1 tablet (5 mg) by mouth every 6 hours as needed for pain      Facility-Administered Medications: None     Time spent: 25 minutes    Medication history completed by:   WILLIAM Rojas (Pharmacy Intern)

## 2020-04-29 NOTE — ED NOTES
[facility], [place]   ED Nurse to Floor Handoff     Kandi Parker is a 34 year old female who speaks English and lives with family members,  in a home  They arrived in the ED by car from home    ED Chief Complaint: Hypertension (Pt reported high blood pressure readings at home after she delivered her baby 5 days ago, hx of hypertension)    ED Dx;   Final diagnoses:   Preeclampsia in postpartum period         Needed?: No    Allergies: No Known Allergies.  Past Medical Hx:   Past Medical History:   Diagnosis Date     Hypertension       Baseline Mental status: WDL  Current Mental Status changes: at basesline    Infection present or suspected this encounter: no  Sepsis suspected: No  Isolation type: No active isolations     Activity level - Baseline/Home:  Independent  Activity Level - Current:   Independent    Bariatric equipment needed?: No    In the ED these meds were given:   Medications   labetalol (NORMODYNE/TRANDATE) syringe 10 mg (10 mg Intravenous Given 4/29/20 1602)       Drips running?  No    Home pump  No    Current LDAs  Peripheral IV 04/29/20 Right (Active)   Site Assessment WDL 04/29/20 1519   Phlebitis Scale 0-->no symptoms 04/29/20 1519   Infiltration Scale 0 04/29/20 1519   Number of days: 0       Labs results:   Labs Ordered and Resulted from Time of ED Arrival Up to the Time of Departure from the ED   CBC WITH PLATELETS DIFFERENTIAL - Abnormal; Notable for the following components:       Result Value    RBC Count 3.64 (*)     Hemoglobin 11.1 (*)     Hematocrit 34.2 (*)     All other components within normal limits   COMPREHENSIVE METABOLIC PANEL - Abnormal; Notable for the following components:    Albumin 2.8 (*)     All other components within normal limits   ROUTINE UA WITH MICROSCOPIC - Abnormal; Notable for the following components:    Blood Urine Moderate (*)     Bacteria Urine Few (*)     Mucous Urine Present (*)     All other components within normal  limits   MAY SALINE LOCK IV   IP ASSIGN PROVIDER TEAM TO TREATMENT TEAM       Imaging Studies: No results found for this or any previous visit (from the past 24 hour(s)).    Recent vital signs:   BP (!) 142/83   Pulse 74   Temp 97.9  F (36.6  C) (Oral)   Resp 20   Wt 82.2 kg (181 lb 4.8 oz)   LMP 07/30/2019   SpO2 97%   BMI 29.26 kg/m      Dorinda Coma Scale Score: 15 (04/29/20 1447)       Cardiac Rhythm: Normal Sinus  Pt needs tele? Yes  Skin/wound Issues: None    Code Status: Full Code    Pain control: pt had none    Nausea control: pt had none    Abnormal labs/tests/findings requiring intervention: see emr    Family present during ED course? No   Family Comments/Social Situation comments: n/a    Tasks needing completion: None    Juan Vines, RN  [phone] [place] [facility]  [phone] [facility]

## 2020-04-29 NOTE — DISCHARGE SUMMARY
OB/GYN DISCHARGE SUMMARY    Admit date: 2020  Discharge date: 20     Admit Dx:   -    - POD#5 s/p RLTCS  - Superimposed preeclampsia with severe features    Discharge Dx:  - Same as above      Procedures:  - IV magnesium    Admit HPI:  Kandi Parker is a 34 year old  POD#5 s/p RLTCS who presents from home with elevated BP. Patient has known cHTN and was taking labetalol 50mg BID throughout pregnancy. Postpartum, he labetalol has been up-titrated, most recently to 200mg BID. Last night she had BP with systolic in 160s and also has noticed diastolic readings of >100.     Please see her admit H&P for full details of her PMH, PSH, Meds, Allergies and exam on admit.    Hospital course:  Patient was admitted for management of superimposed preeclampsia with severe features. She required IV labetalol on admission due to sustained severe range blood pressure. IV magnesium was administered for seizure prophylaxis for 24 hours. Her labetalol was uptitrated to 400mg TID to good effect. On HD#3 her blood pressure was at goal and she demonstrated no signs of preeclampsia. She was discharged home on labetalol 400 mg TID    Discharge Medications:  Current Discharge Medication List      START taking these medications    Details   hydrOXYzine (ATARAX) 50 MG tablet Take 1 tablet (50 mg) by mouth every 6 hours as needed for anxiety  Qty: 60 tablet, Refills: 1    Associated Diagnoses: Anxiety         CONTINUE these medications which have CHANGED    Details   labetalol (NORMODYNE) 200 MG tablet Take 2 tablets (400 mg) by mouth every 8 hours  Qty: 240 tablet, Refills: 0    Associated Diagnoses: Preeclampsia in postpartum period         CONTINUE these medications which have NOT CHANGED    Details   acetaminophen (TYLENOL) 325 MG tablet Take 2 tablets (650 mg) by mouth every 6 hours as needed for mild pain Start after Delivery.  Qty: 100 tablet, Refills: 0    Associated Diagnoses: Multigravida of advanced maternal  age in third trimester      ibuprofen (ADVIL/MOTRIN) 600 MG tablet Take 1 tablet (600 mg) by mouth every 6 hours as needed for moderate pain Start after delivery  Qty: 60 tablet, Refills: 0    Associated Diagnoses: Multigravida of advanced maternal age in third trimester      Prenatal MV-Min-Fe Fum-FA-DHA (PRENATAL+DHA PO) Take 1 tablet by mouth At Bedtime       oxyCODONE (ROXICODONE) 5 MG tablet Take 1 tablet (5 mg) by mouth every 6 hours as needed for pain  Qty: 10 tablet, Refills: 0    Associated Diagnoses: Status post  section           Discharge/Disposition:  Kandi Parker was discharged to home in stable condition with the following instructions/medications:  1) Call for temperature > 100.4, bright red vaginal bleeding >1 pad an hour x 2 hours, foul smelling vaginal discharge, pain not controlled by usual oral pain meds, persistent nausea and vomiting not controlled on medications, drainage or redness from incision site, severe headache, vision changes, chest pain, shortness of breath, RUQ pain.  2) She was instructed to follow-up with her primary OB in 6 weeks for a routine postpartum visit and BP check   3) Discharge activity:  No heavy lifting >15 lbs or strenuous activity for 6 weeks, pelvic rest for 6 weeks, no driving or operating machinery while on narcotics.    Yudelka Xiong MD   OB/GYN Resident PGY-3  2020 10:28 AM        Physician Attestation   I, Rosa Reno, saw and evaluated this patient prior to discharge.  I discussed the patient with the resident/fellow and agree with plan of care as documented in the note.      I personally reviewed vital signs, medications, labs and exam.    I personally spent 20 minutes on discharge activities.    Rosa Reno MD  Date of Service (when I saw the patient): 20

## 2020-04-29 NOTE — ED TRIAGE NOTES
Pt had a  done 5 days ago and delivered a baby but her blood pressure has been elevated. Pt has been taking labetolol and has been increasing dosage per MD advice and now is currently on 200mgs BID.

## 2020-04-29 NOTE — ED PROVIDER NOTES
[facility] EMERGENCY DEPARTMENT ([facility])     2020  History     Chief Complaint   Patient presents with     Hypertension     Pt reported high blood pressure readings at home after she delivered her baby 5 days ago, hx of hypertension     The history is provided by the patient and medical records.   Hypertension     Kandi Parker is a 34 year old female with a medical history significant for HTN, postpartum hemorrhage, multigravida, and S/P  who presents to the ED today complaining of hypertension. Patient reports that she had high blood pressure pre-pregnancy, maintained high blood pressure during her pregnancy, but now after pregnancy is having large fluctuations in her blood pressure. Patient was discharged on (2020) after completion of her , and told to monitor blood pressure twice per day. On Monday (2020), she began to notice her blood pressure was getting higher than normal.    I have reviewed the Medications, Allergies, Past Medical and Surgical History, and Social History in the wikifolio system.  PAST MEDICAL HISTORY:   Past Medical History:   Diagnosis Date     Hypertension        PAST SURGICAL HISTORY:   Past Surgical History:   Procedure Laterality Date      SECTION  2017      SECTION N/A 2020    Procedure: REPEAT  SECTION;  Surgeon: Carmen Medina MD;  Location: [facility]       Past medical history, past surgical history, medications, and allergies were reviewed with the patient. Additional pertinent items: None    FAMILY HISTORY:   Family History   Problem Relation Age of Onset     Hypertension Mother      Diabetes Type 2  Paternal Grandmother      Colon Cancer Maternal Grandmother        SOCIAL HISTORY:   Social History     Tobacco Use     Smoking status: Never Smoker     Smokeless tobacco: Never Used   Substance Use Topics     Alcohol use: Not Currently     Social history was reviewed with the patient. Additional  pertinent items: None      Patient's Medications   New Prescriptions    No medications on file   Previous Medications    ACETAMINOPHEN (TYLENOL) 325 MG TABLET    Take 2 tablets (650 mg) by mouth every 6 hours as needed for mild pain Start after Delivery.    FERROUS SULFATE (FEROSUL) 325 (65 FE) MG TABLET    Take 1 tablet (325 mg) by mouth daily (with breakfast)    IBUPROFEN (ADVIL/MOTRIN) 600 MG TABLET    Take 1 tablet (600 mg) by mouth every 6 hours as needed for moderate pain Start after delivery    LABETALOL (NORMODYNE) 100 MG TABLET    Take 0.5 tablets (50 mg) by mouth 2 times daily    OXYCODONE (ROXICODONE) 5 MG TABLET    Take 1 tablet (5 mg) by mouth every 6 hours as needed for pain    PRENATAL MV-MIN-FE FUM-FA-DHA (PRENATAL+DHA PO)        SENNA-DOCUSATE (SENOKOT-S/PERICOLACE) 8.6-50 MG TABLET    Take 1 tablet by mouth daily Start after delivery.   Modified Medications    No medications on file   Discontinued Medications    No medications on file        No Known Allergies     Review of Systems   Hematological:        (Positive for high blood pressure)   All other systems reviewed and are negative.    A complete review of systems was performed with pertinent positives and negatives noted in the HPI, and all other systems negative.       Physical Exam   BP: (!) 163/98  Pulse: 76  Heart Rate: 77  Temp: 97.9  F (36.6  C)  Resp: 18  Weight: 82.2 kg (181 lb 4.8 oz)  SpO2: 99 %      Physical Exam  Constitutional:       General: She is not in acute distress.     Appearance: She is not diaphoretic.   HENT:      Head: Atraumatic.      Mouth/Throat:      Pharynx: No oropharyngeal exudate.   Eyes:      General: No scleral icterus.     Pupils: Pupils are equal, round, and reactive to light.   Cardiovascular:      Heart sounds: Normal heart sounds.   Pulmonary:      Effort: No respiratory distress.      Breath sounds: Normal breath sounds.   Abdominal:      General: Bowel sounds are normal.      Palpations: Abdomen is soft.       Tenderness: There is no abdominal tenderness.   Musculoskeletal:         General: No tenderness.   Skin:     General: Skin is warm.      Findings: No rash.   Neurological:      General: No focal deficit present.      Mental Status: She is oriented to person, place, and time.      Cranial Nerves: No cranial nerve deficit.      Sensory: No sensory deficit.      Motor: No weakness.      Coordination: Coordination normal.      Deep Tendon Reflexes: Reflexes normal.         ED Course   3:43 PM  The patient was seen and examined by Quinton Cole MD in Room ED11.        Procedures           Results for orders placed or performed during the hospital encounter of 04/29/20 (from the past 24 hour(s))   CBC with platelets differential   Result Value Ref Range    WBC 10.4 4.0 - 11.0 10e9/L    RBC Count 3.64 (L) 3.8 - 5.2 10e12/L    Hemoglobin 11.1 (L) 11.7 - 15.7 g/dL    Hematocrit 34.2 (L) 35.0 - 47.0 %    MCV 94 78 - 100 fl    MCH 30.5 26.5 - 33.0 pg    MCHC 32.5 31.5 - 36.5 g/dL    RDW 13.8 10.0 - 15.0 %    Platelet Count 319 150 - 450 10e9/L    Diff Method Automated Method     % Neutrophils 61.8 %    % Lymphocytes 27.3 %    % Monocytes 7.0 %    % Eosinophils 2.2 %    % Basophils 0.3 %    % Immature Granulocytes 1.4 %    Nucleated RBCs 0 0 /100    Absolute Neutrophil 6.4 1.6 - 8.3 10e9/L    Absolute Lymphocytes 2.8 0.8 - 5.3 10e9/L    Absolute Monocytes 0.7 0.0 - 1.3 10e9/L    Absolute Eosinophils 0.2 0.0 - 0.7 10e9/L    Absolute Basophils 0.0 0.0 - 0.2 10e9/L    Abs Immature Granulocytes 0.2 0 - 0.4 10e9/L    Absolute Nucleated RBC 0.0    Comprehensive metabolic panel   Result Value Ref Range    Sodium 139 133 - 144 mmol/L    Potassium 3.9 3.4 - 5.3 mmol/L    Chloride 107 94 - 109 mmol/L    Carbon Dioxide 26 20 - 32 mmol/L    Anion Gap 6 3 - 14 mmol/L    Glucose 79 70 - 99 mg/dL    Urea Nitrogen 11 7 - 30 mg/dL    Creatinine 0.57 0.52 - 1.04 mg/dL    GFR Estimate >90 >60 mL/min/[1.73_m2]    GFR Estimate If Black  >90 >60 mL/min/[1.73_m2]    Calcium 9.0 8.5 - 10.1 mg/dL    Bilirubin Total 0.5 0.2 - 1.3 mg/dL    Albumin 2.8 (L) 3.4 - 5.0 g/dL    Protein Total 7.1 6.8 - 8.8 g/dL    Alkaline Phosphatase 121 40 - 150 U/L    ALT 37 0 - 50 U/L    AST 24 0 - 45 U/L   UA with Microscopic   Result Value Ref Range    Color Urine Straw     Appearance Urine Clear     Glucose Urine Negative NEG^Negative mg/dL    Bilirubin Urine Negative NEG^Negative    Ketones Urine Negative NEG^Negative mg/dL    Specific Gravity Urine 1.004 1.003 - 1.035    Blood Urine Moderate (A) NEG^Negative    pH Urine 5.5 5.0 - 7.0 pH    Protein Albumin Urine Negative NEG^Negative mg/dL    Urobilinogen mg/dL Normal 0.0 - 2.0 mg/dL    Nitrite Urine Negative NEG^Negative    Leukocyte Esterase Urine Negative NEG^Negative    Source Unspecified Urine     WBC Urine <1 0 - 5 /HPF    RBC Urine 2 0 - 2 /HPF    Bacteria Urine Few (A) NEG^Negative /HPF    Squamous Epithelial /HPF Urine 1 0 - 1 /HPF    Mucous Urine Present (A) NEG^Negative /LPF     Medications   labetalol (NORMODYNE/TRANDATE) syringe 10 mg (10 mg Intravenous Given 4/29/20 1602)             Assessments & Plan (with Medical Decision Making)         I have reviewed the nursing notes.    I have reviewed the findings, diagnosis, plan and need for follow up with the patient.    Patient with postpartum preeclampsia now presenting with continued hypertension responding to IV labetalol here in the emergency room she will be admitted to the OB/GYN service and started on magnesium they saw and evaluated the patient here in the emergency room.    Final diagnoses:   Preeclampsia in postpartum period   I, Robbi June, am serving as a trained medical scribe to document services personally performed by Quinton Cole MD, based on the provider's statements to me.     I, Quinton Cole MD, was physically present and have reviewed and verified the accuracy of this note documented by Robbi June.      4/29/2020    [facility], [place], EMERGENCY DEPARTMENT     Quinton Cole MD  04/29/20 [id number]

## 2020-04-29 NOTE — TELEPHONE ENCOUNTER
TC to patient. Advised going to [facility] for evaluation. Routing to AO as FYI.   Nelia Eduardo RN-BSN

## 2020-04-29 NOTE — TELEPHONE ENCOUNTER
Patient calling c/o BP. Took BP right now and it was 156/107 and an hour ago it was 162/101. Increased the labetalol to 200 mg last night and this morning. This morning had a reading of 136/90 at 9am. Has been hanging out at home, and trying to relax. Has anxiety issues and thinks that is contributing to this. Has been having slight headache this morning. Feels more hormonal like when she was pregnant. Hasn't been relieved with pain killers. No swelling. No vision changes. No SOB. No upper epigastric pain. Pt has appt tomorrow in clinic. Should she be seen sooner? Does she need to go to ER? Please advise. Thanks.   Nelia Eduardo, LEXA-BSN

## 2020-04-30 LAB — MAGNESIUM SERPL-MCNC: 6.2 MG/DL (ref 1.6–2.3)

## 2020-04-30 PROCEDURE — 25000132 ZZH RX MED GY IP 250 OP 250 PS 637: Performed by: STUDENT IN AN ORGANIZED HEALTH CARE EDUCATION/TRAINING PROGRAM

## 2020-04-30 PROCEDURE — 25800030 ZZH RX IP 258 OP 636: Performed by: STUDENT IN AN ORGANIZED HEALTH CARE EDUCATION/TRAINING PROGRAM

## 2020-04-30 PROCEDURE — 83735 ASSAY OF MAGNESIUM: CPT | Performed by: STUDENT IN AN ORGANIZED HEALTH CARE EDUCATION/TRAINING PROGRAM

## 2020-04-30 PROCEDURE — 12000001 ZZH R&B MED SURG/OB UMMC

## 2020-04-30 PROCEDURE — 36415 COLL VENOUS BLD VENIPUNCTURE: CPT | Performed by: STUDENT IN AN ORGANIZED HEALTH CARE EDUCATION/TRAINING PROGRAM

## 2020-04-30 RX ORDER — AMOXICILLIN 250 MG
2 CAPSULE ORAL 2 TIMES DAILY PRN
Status: DISCONTINUED | OUTPATIENT
Start: 2020-04-30 | End: 2020-05-01 | Stop reason: HOSPADM

## 2020-04-30 RX ADMIN — ACETAMINOPHEN 650 MG: 325 TABLET, FILM COATED ORAL at 02:00

## 2020-04-30 RX ADMIN — IBUPROFEN 600 MG: 600 TABLET ORAL at 20:25

## 2020-04-30 RX ADMIN — IBUPROFEN 600 MG: 600 TABLET ORAL at 08:00

## 2020-04-30 RX ADMIN — SODIUM CHLORIDE, POTASSIUM CHLORIDE, SODIUM LACTATE AND CALCIUM CHLORIDE 75 ML/HR: 600; 310; 30; 20 INJECTION, SOLUTION INTRAVENOUS at 08:00

## 2020-04-30 RX ADMIN — LABETALOL HCL 400 MG: 200 TABLET, FILM COATED ORAL at 05:54

## 2020-04-30 RX ADMIN — LABETALOL HCL 400 MG: 200 TABLET, FILM COATED ORAL at 21:59

## 2020-04-30 RX ADMIN — IBUPROFEN 600 MG: 600 TABLET ORAL at 02:00

## 2020-04-30 RX ADMIN — ACETAMINOPHEN 650 MG: 325 TABLET, FILM COATED ORAL at 20:25

## 2020-04-30 RX ADMIN — ACETAMINOPHEN 650 MG: 325 TABLET, FILM COATED ORAL at 14:00

## 2020-04-30 RX ADMIN — ACETAMINOPHEN 650 MG: 325 TABLET, FILM COATED ORAL at 08:00

## 2020-04-30 RX ADMIN — LABETALOL HCL 400 MG: 200 TABLET, FILM COATED ORAL at 14:00

## 2020-04-30 RX ADMIN — IBUPROFEN 600 MG: 600 TABLET ORAL at 14:00

## 2020-04-30 NOTE — PROVIDER NOTIFICATION
04/30/20 0613   Provider Notification   Provider Name/Title Tyrese   Method of Notification Phone   Notification Reason Status Update     Patient having increased effects of magnesium infusion. Patient states feeling heavy, inability to focus and slight headache. Reflexes +1.  Urine output adequate. VSS.  Lungs clear. Request for magnesium level order.  MD order to stop magnesium infusion pending mag level.

## 2020-04-30 NOTE — PROGRESS NOTES
[facility]  Magnesium Check Note    Subjective:     The feelings of weakness and lightheaded have improved. She denies headache, vision changes/spots in vision, nausea/vomiting, chest pain, shortness of breath, RUQ pain, or worsening edema.    Objective:  Patient Vitals for the past 4 hrs:   BP Temp Temp src Heart Rate Resp SpO2   20 0800 118/83 97.8  F (36.6  C) Oral 84 18 96 %     Gen: NAD. A&Ox3.  CV:  RRR, no murmurs  Pulm:  CTAB, no wheezes or crackles. Normal respiratory effort.  Abd:  Soft, non-tender in RUQ  Ext:  Patellar reflexes 2+ b/l, 0 clonus b/l, 1+ LE edema b/l    I/O:  I/O last 3 completed shifts:  In: 2891.25 [P.O.:1550; I.V.:1341.25]  Out: 2300 [Urine:2300]    Assessment/Plan:  Kandi Parker is a 34 year old  on POD# 6 s/p RLTCS, with pregnancy complicated by preeclampsia with severe features.    Preeclampsia with severe features   - No signs or symptoms of worsening pre-eclampsiaa   - Mag sulfate for seizure prophylaxis:  1.5g/hr   - BP: normal   - UOP: 900cc since 0800. Adequate   -  HELLP labs normal   - Next clinical mag check in 4 hours    Postpartum:     - Routine postpartum care    Missy Louis MD  OB/GYN Resident, PGY-2  2020

## 2020-04-30 NOTE — PROGRESS NOTES
[facility]  Magnesium Check Note    Subjective:     Feeling weak and lightheaded. She denies headache, vision changes/spots in vision, nausea/vomiting, chest pain, shortness of breath, RUQ pain, or worsening edema.    Objective:  Patient Vitals for the past 4 hrs:   BP Temp Temp src Heart Rate Resp SpO2 Weight   20 0800 118/83 97.8  F (36.6  C) Oral 84 18 96 % --   20 0552 119/81 -- -- 88 16 96 % 80.3 kg (177 lb)   20 0515 -- -- -- -- 16 95 % --     Gen: NAD. A&Ox3.  CV:  RRR, no murmurs  Pulm:  CTAB, no wheezes or crackles. Normal respiratory effort.  Abd:  Soft, non-tender in RUQ  Ext:  Patellar reflexes 2+ b/l, 0 clonus b/l, 1+ LE edema b/l    I/O:  I/O last 3 completed shifts:  In: 2891.25 [P.O.:1550; I.V.:1341.25]  Out: 2300 [Urine:2300]    Assessment/Plan:  Kandi Parker is a 34 year old  on POD# 6 s/p RLTCS, with pregnancy complicated by preeclampsia with severe features.    Preeclampsia with severe features   - No signs or symptoms of worsening pre-eclampsiaa   - Mag sulfate for seizure prophylaxis: will turn Mag down to 1.5g/hr   - BP: normal   - UOP: 1650cc since midnight. Adequate   -  HELLP labs normal   - Next clinical mag check in 4 hours    Postpartum:     - Routine postpartum care    Missy Louis MD  OB/GYN Resident, PGY-2  2020

## 2020-04-30 NOTE — PLAN OF CARE
Stable patient that arrived to floor via wheelchair from ED. VSS upon arrival. No c/o pree symptoms. Will start Mag loading dose upon arrival from pharmacy per MD orders. FOB and infant arrived shortly after patient. Falls band placed on patient. No concerns at this time.

## 2020-04-30 NOTE — PROGRESS NOTES
[facility]  Magnesium Check Note    Subjective:     She denies headache, vision changes/spots in vision, nausea/vomiting, chest pain, shortness of breath, RUQ pain, or worsening edema.    Objective:  Patient Vitals for the past 4 hrs:   BP Temp Temp src Heart Rate Resp SpO2   20 1400 129/83 98.1  F (36.7  C) Oral 84 16 98 %   20 1100 119/75 -- -- 89 18 95 %     Gen: NAD. A&Ox3.  CV:  RRR, no murmurs  Pulm:  CTAB, no wheezes or crackles. Normal respiratory effort.  Abd:  Soft, non-tender in RUQ  Ext:  Patellar reflexes 2+ b/l, 0 clonus b/l, 1+ LE edema b/l    I/O:  I/O last 3 completed shifts:  In: 2891.25 [P.O.:1550; I.V.:1341.25]  Out: 2300 [Urine:2300]    Assessment/Plan:  Kandi Parker is a 34 year old  on POD# 6 s/p RLTCS, with pregnancy complicated by preeclampsia with severe features.    Preeclampsia with severe features   - No signs or symptoms of worsening pre-eclampsiaa   - Mag sulfate for seizure prophylaxis:  1.5g/hr   - BP: normal   - UOP: 850cc since 1200. Adequate   -  HELLP labs normal   - Next clinical mag check in 4 hours    Postpartum:     - Routine postpartum care    Missy Louis MD  OB/GYN Resident, PGY-2  2020

## 2020-04-30 NOTE — PLAN OF CARE
VSS, except for elevated BP (140s/80-90s). Denies s/sx of Pre-E, except for dull frontal headache. DTR +2, no clonus. Magnesium sulfate discontinued at 1900. Pt felt better and the dull headache resolved an hour later, but pt felt numbness and tingling of R arm and shakiness; Dr. Delgado notified. Per pt, the R arm numbness/tingling and shakiness has improved. Up ad tiffany. Tolerating regular diet. Voiding without difficulty. Passing gas and had two BM this evening, with one being diarrhea. Baby and spouse at bedside. Continue to monitor.

## 2020-04-30 NOTE — PROGRESS NOTES
Postpartum Magnesium Check    S: Patient did have HA, now resolved. No vision changes, upper abdominal pain, chest pain or SOB.     O:   Patient Vitals for the past 24 hrs:   BP Temp Temp src Pulse Heart Rate Resp SpO2 Weight   20 139/85 -- -- 87 -- -- -- --   20 139/85 -- -- 86 -- 16 -- --   20 131/87 -- -- -- 77 16 97 % --   20 134/77 -- -- -- 77 16 97 % --   20 137/83 -- -- -- 80 16 97 % --   20 134/88 -- -- -- 80 16 97 % --   20 126/73 -- -- -- 78 18 98 % --   20 (!) 138/90 -- -- -- 78 18 97 % --   20 130/79 -- -- -- 80 20 98 % --   20 191 (!) 127/93 -- -- -- 71 18 98 % --   20 1905 127/81 -- -- -- 74 20 98 % --   20 1900 138/81 -- -- -- 71 18 97 % --   20 1855 104/86 98.5  F (36.9  C) Oral -- 82 18 97 % --   20 1758 135/87 98.2  F (36.8  C) Oral 74 -- 18 -- --   20 1740 (!) 131/92 -- -- 79 82 18 98 % --   20 1725 130/79 -- -- 67 82 18 96 % --   20 1655 125/80 -- -- 72 76 12 97 % --   20 1640 138/83 -- -- 67 79 17 98 % --   20 1625 (!) 142/83 -- -- 74 83 20 97 % --   20 1610 138/87 -- -- 77 86 21 96 % --   04/29/20 1601 (!) 143/87 -- -- -- 77 28 98 % --   20 1447 (!) 163/98 97.9  F (36.6  C) Oral 76 -- 18 99 % 82.2 kg (181 lb 4.8 oz)   Gen: appears comfortable, NAD  Resp: CTAB  CV: RRR with no murmurs  Abdomen: soft, nontender  Ext: warm, well perfused, trace edema    I/O last 3 completed shifts:  In: 1191.25 [P.O.:850; I.V.:341.25]  Out: 650 [Urine:650]    A/P: Kandi Parker is a 34 year old  POD#6 s/p RLTCS. Now HD#2 admitted for superimposed preeclampsia with severe features.      # Superimposed pre-eclampsia with SF:  -  Blood pressures: normal range at present, continue frequent BP monitoring. Notify MD with 160/110 or greater. PRN labetalol or hydralazine for sustained severe range blood pressures.   - Increase home labetalol to  400mg TID (previously 200mg BID)  - Magnesium: 4g>2g/hr. Continue for 24 hours  - Symptoms: None currently  - Strict I/O's, Daily weights  - HELLP labs: Normal, repeat as clinically indicated. UPC previously elevated at 0.32 on 4/24 (baseline too low to detect)     # Routine post-partum cares  - Pain: ibuprofen, tylenol, oxycodone  - Breastfeeding       Yudelka Xiong MD  OB/GYN Resident PGY-3  4/30/2020 12:29 AM

## 2020-04-30 NOTE — PROGRESS NOTES
Magnesium Check    S:   Patient is feeling very tired. She is oriented to time and place, but says it is hard to focus. Denies HA, vision changes, upper abdominal pain, chest pain or SOB.     O:   Patient Vitals for the past 24 hrs:   BP Temp Temp src Pulse Heart Rate Resp SpO2 Weight   20 0515 -- -- -- -- -- 16 95 % --   20 0200 132/84 -- -- 89 -- 18 97 % --   20 139/85 -- -- 87 -- 18 97 % --   20 139/85 -- -- 86 -- 16 -- --   20 131/87 -- -- -- 77 16 97 % --   20 134/77 -- -- -- 77 16 97 % --   20 137/83 -- -- -- 80 16 97 % --   20 134/88 -- -- -- 80 16 97 % --   20 126/73 -- -- -- 78 18 98 % --   20 (!) 138/90 -- -- -- 78 18 97 % --   20 130/79 -- -- -- 80 20 98 % --   20 (!) 127/93 -- -- -- 71 18 98 % --   20 127/81 -- -- -- 74 20 98 % --   20 138/81 -- -- -- 71 18 97 % --   20 1855 104/86 98.5  F (36.9  C) Oral -- 82 18 97 % --   20 175 135/87 98.2  F (36.8  C) Oral 74 -- 18 -- --   20 (!) 131/92 -- -- 79 82 18 98 % --   20 1725 130/79 -- -- 67 82 18 96 % --   20 1655 125/80 -- -- 72 76 12 97 % --   20 1640 138/83 -- -- 67 79 17 98 % --   20 1625 (!) 142/83 -- -- 74 83 20 97 % --   20 1610 138/87 -- -- 77 86 21 96 % --   20 1601 (!) 143/87 -- -- -- 77 28 98 % --   20 1447 (!) 163/98 97.9  F (36.6  C) Oral 76 -- 18 99 % 82.2 kg (181 lb 4.8 oz)   Gen: appears comfortable, NAD  Resp: CTAB  CV: RRR with no murmurs  Abdomen: soft, nontender  Incision: C/D/I  Neuro: absent patellar reflex, absent clonus   Ext: warm, well perfused, trace edema    I/O last 3 completed shifts:  In: 1191.25 [P.O.:850; I.V.:341.25]  Out: 650 [Urine:650]    A/P: Kandi Parker is a 34 year old  POD#6 s/p RLTCS. Now HD#2 admitted for superimposed preeclampsia with severe features.      # Superimposed pre-eclampsia with SF:  -   Blood pressures: normal range at present, continue frequent BP monitoring. Notify MD with 160/110 or greater. PRN labetalol or hydralazine for sustained severe range blood pressures.   - Increase home labetalol to 400mg TID (previously 200mg BID)  - Magnesium: 4g>2g/hr. Will discontinue pending magnesium level.   - Symptoms: None currently  - Strict I/O's, Daily weights  - HELLP labs: Normal, repeat as clinically indicated. UPC previously elevated at 0.32 on 4/24 (baseline too low to detect)     # Routine post-partum cares  - Pain: ibuprofen, tylenol, oxycodone  - Breastfeeding    Disposition: inpatient for management of superimposed preeclampsia with severe features, currently on IV magnesium     Yudelka Xiong MD  OB/GYN Resident PGY-3  4/30/2020 5:21 AM

## 2020-04-30 NOTE — PLAN OF CARE
Patient is stable on magnesium sulfate, running at 1.5 g/hr. Still feels tired and dizzy upon standing, but much better than early this morning.  and infant are here with her. Denies pain, headache, visual disturbance, RUQ pain. Continue pre eclampsia monitoring and treatment.

## 2020-05-01 VITALS
OXYGEN SATURATION: 98 % | WEIGHT: 177 LBS | SYSTOLIC BLOOD PRESSURE: 144 MMHG | DIASTOLIC BLOOD PRESSURE: 86 MMHG | RESPIRATION RATE: 16 BRPM | HEART RATE: 75 BPM | BODY MASS INDEX: 28.57 KG/M2 | TEMPERATURE: 99.4 F

## 2020-05-01 PROCEDURE — 25000132 ZZH RX MED GY IP 250 OP 250 PS 637: Performed by: STUDENT IN AN ORGANIZED HEALTH CARE EDUCATION/TRAINING PROGRAM

## 2020-05-01 RX ORDER — HYDROXYZINE HYDROCHLORIDE 25 MG/1
50 TABLET, FILM COATED ORAL EVERY 6 HOURS PRN
Status: DISCONTINUED | OUTPATIENT
Start: 2020-05-01 | End: 2020-05-01 | Stop reason: HOSPADM

## 2020-05-01 RX ORDER — LABETALOL 200 MG/1
400 TABLET, FILM COATED ORAL EVERY 8 HOURS
Qty: 240 TABLET | Refills: 0 | Status: SHIPPED | OUTPATIENT
Start: 2020-05-01 | End: 2020-06-04

## 2020-05-01 RX ORDER — HYDROXYZINE HYDROCHLORIDE 50 MG/1
50 TABLET, FILM COATED ORAL EVERY 6 HOURS PRN
Qty: 60 TABLET | Refills: 1 | Status: SHIPPED | OUTPATIENT
Start: 2020-05-01 | End: 2020-05-01

## 2020-05-01 RX ORDER — LABETALOL 200 MG/1
400 TABLET, FILM COATED ORAL EVERY 8 HOURS
Qty: 240 TABLET | Refills: 0 | Status: SHIPPED | OUTPATIENT
Start: 2020-05-01 | End: 2020-05-01

## 2020-05-01 RX ORDER — HYDROXYZINE HYDROCHLORIDE 50 MG/1
50 TABLET, FILM COATED ORAL EVERY 6 HOURS PRN
Qty: 60 TABLET | Refills: 1 | Status: SHIPPED | OUTPATIENT
Start: 2020-05-01 | End: 2020-06-04

## 2020-05-01 RX ADMIN — IBUPROFEN 600 MG: 600 TABLET ORAL at 02:43

## 2020-05-01 RX ADMIN — ACETAMINOPHEN 650 MG: 325 TABLET, FILM COATED ORAL at 02:43

## 2020-05-01 RX ADMIN — ACETAMINOPHEN 650 MG: 325 TABLET, FILM COATED ORAL at 08:42

## 2020-05-01 RX ADMIN — LABETALOL HCL 400 MG: 200 TABLET, FILM COATED ORAL at 05:57

## 2020-05-01 RX ADMIN — IBUPROFEN 600 MG: 600 TABLET ORAL at 08:42

## 2020-05-01 NOTE — PLAN OF CARE
Vital signs stable except slightly elevated /87. Assessment WDL. Denies side effects of magnesium. States evening symptoms numbness in right arm and generalized shakiness have resolved. +2 reflexes, no clonus. Up ad tiffany, voiding independently. Urine output adequate. Partner at the bedside. Continue with plan of care

## 2020-05-01 NOTE — DISCHARGE INSTRUCTIONS
Preeclampsia   Call your doctor right away if you have any of the following:  - Edema (swelling) in your face or hands  - Rapid weight gain-about 1 pound or more in a day  - Headache  - Abdominal pain on your right side  - Vision problems (flashes or spots)  - You have questions or concerns once you return home.Check your blood pressure once a day after you have been sitting or resting for at least 30 minutes.  Only recheck if >150/100. If >150/100 on recheck give us a call.   [facility]  Professional Building  [address] [place], [place] [address]e. [address]. [address]  [place] [address] [phone]

## 2020-05-01 NOTE — PROVIDER NOTIFICATION
Pt reports numbness and tingling of her R arm that began 15 min ago. She also feels shaky. BP was 140/91, HR 90. RR 16. Temp 98.3.

## 2020-05-01 NOTE — PLAN OF CARE
Vital signs within normal range with blood pressures of 132/81 and 144/86. Pt has reflexes of +2 on both legs and no clonus. Pt denies pain and any symptoms of preeclampsia. MD was notified about the blood pressure of 144/86 and was not concerned. Medicated pt with Ibuprofen and Tylenol for pain control. Reviewed discharge instruction on preeclampsia with pt. Pt was given discharge medications and a copy of the discharge papers. Pt is discharged home.

## 2020-05-01 NOTE — PROGRESS NOTES
Progress Notes  2020     S:   Patient is feeling well this morning. She has some anxiety around her blood pressures and is wondering if she should try a medication prn. Denies HA, vision changes, upper abdominal pain, chest pain or SOB.     O:   Patient Vitals for the past 24 hrs:   BP Temp Temp src Pulse Heart Rate Resp SpO2 Weight   20 0034 124/76 98.7  F (37.1  C) Oral -- 80 16 98 % --   20 2158 (!) 147/86 98.9  F (37.2  C) Oral -- 83 16 98 % --   20 2030 (!) 140/91 98.3  F (36.8  C) Oral -- 90 16 97 % --   20 1800 121/80 98.1  F (36.7  C) Oral -- 84 16 97 % --   20 1400 129/83 98.1  F (36.7  C) Oral -- 84 16 98 % --   20 1100 119/75 -- -- -- 89 18 95 % --   20 0800 118/83 97.8  F (36.6  C) Oral -- 84 18 96 % --   20 0552 119/81 -- -- -- 88 16 96 % 80.3 kg (177 lb)   20 0515 -- -- -- -- -- 16 95 % --   20 0200 132/84 -- -- 89 -- 18 97 % --   Gen: appears comfortable, NAD  Resp: CTAB  CV: RRR with no murmurs  Abdomen: soft, nontender  Incision: C/D/I  Neuro: absent patellar reflex, absent clonus   Ext: warm, well perfused, trace edema    I/O last 3 completed shifts:  In: 4679.79 [P.O.:2550; I.V.:2129.79]  Out: 4350 [Urine:4350]      Weight 181 lb on adm > 177 lb ()    A/P: Kandi Parker is a 34 year old  POD#7 s/p RLTCS. Now HD#3 admitted for superimposed preeclampsia with severe features.      # Superimposed pre-eclampsia with SF:  -  Blood pressures: normal range to mild range at present, continue frequent BP monitoring. Notify MD with 160/110 or greater. PRN labetalol or hydralazine for sustained severe range blood pressures.   - Increased from home labetalol to 400mg TID (previously 200mg BID)  - s/p 24h Mag  - Strict I/O's, Daily weights  - HELLP labs: Normal, repeat as clinically indicated. UPC previously elevated at 0.32 on  (baseline too low to detect)     # Routine post-partum cares  - Pain: ibuprofen, tylenol, oxycodone  -  Breastfeeding     # Anxiety  - Vistaril prn    Discharge pending BPs    Carmen Delgado MD  Obstetrics & Gynecology, PGY-2  2020 8:18 AM      Physician Attestation   I, Rosa Reno MD, personally examined and evaluated this patient.  I discussed the patient with the resident/fellow and care team, and agree with the assessment and plan of care as documented in the note of 20.      I personally reviewed vital signs, medications, labs and exam.    Key findings: 34 year old  on POD 7 s/p RLTCS readmitted with pre-eclampsia with severe features. Now s/p 24h magnesium sulfate. No symptoms. Bp normal to mild range on labetalol 400mg PO TID. Plan to discharge to home later today. Patient will check BPs at home once daily. Discussed her anxiety. Will try hydroxyzine PRN. Plan to send FARTUN 7 in 1 week. Reviewed s/sx pre-eclampsia and postpartum depression.   Rosa Reno MD  Date of Service (when I saw the patient): 20

## 2020-05-01 NOTE — PROVIDER NOTIFICATION
Dr. Xiong is okay with pt discharging home after the last blood pressure of 144/86     05/01/20 1245   Provider Notification   Provider Name/Title Dr. Xiong

## 2020-05-02 ENCOUNTER — NURSE TRIAGE (OUTPATIENT)
Dept: NURSING | Facility: CLINIC | Age: 35
End: 2020-05-02

## 2020-06-04 ENCOUNTER — PRENATAL OFFICE VISIT (OUTPATIENT)
Dept: OBGYN | Facility: CLINIC | Age: 35
End: 2020-06-04
Payer: COMMERCIAL

## 2020-06-04 VITALS
WEIGHT: 172.8 LBS | DIASTOLIC BLOOD PRESSURE: 82 MMHG | HEIGHT: 66 IN | HEART RATE: 81 BPM | BODY MASS INDEX: 27.77 KG/M2 | SYSTOLIC BLOOD PRESSURE: 135 MMHG | OXYGEN SATURATION: 98 %

## 2020-06-04 PROCEDURE — 99207 ZZC POST PARTUM EXAM: CPT | Performed by: OBSTETRICS & GYNECOLOGY

## 2020-06-04 PROCEDURE — 99212 OFFICE O/P EST SF 10 MIN: CPT | Mod: 24 | Performed by: OBSTETRICS & GYNECOLOGY

## 2020-06-04 RX ORDER — LABETALOL 100 MG/1
50 TABLET, FILM COATED ORAL 2 TIMES DAILY
Qty: 100 TABLET | Refills: 1 | Status: SHIPPED | OUTPATIENT
Start: 2020-06-04

## 2020-06-04 ASSESSMENT — MIFFLIN-ST. JEOR: SCORE: 1490.57

## 2020-06-04 ASSESSMENT — PATIENT HEALTH QUESTIONNAIRE - PHQ9: SUM OF ALL RESPONSES TO PHQ QUESTIONS 1-9: 0

## 2020-06-04 NOTE — PROGRESS NOTES
"[facility]- OBGYN    CC: routine postpartum visit.    S:Kandi Parker is a 34 year old  s/p RLTCS on 4/24, course complicated by CHTN with SI pre-e and readmission for BP on 4/29 who presents today for routine postpartum visit.  Patient reports she has been feeling well.  She skipped a day or 2 of labetalol when her BPs have been low.  She denies any pain.  She has had no vaginal bleeding for 2 weeks.  Denies any bowel or bladder dysfunction.  Feeding is going well.  Baby is gaining normal weight.  She is sleep \"as good as possible\".  Mood is good.  She has not resumed intercourse.        O:   Patient Vitals for the past 24 hrs:   BP Pulse SpO2 Height Weight   06/04/20 1722 135/82 81 98 % 1.676 m (5' 6\") 78.4 kg (172 lb 12.8 oz)   ]  Exam:  General- awake, alert, answering questions appropriately, appears comfortable  CV- regular rate  Lung- breathing comfortably on room air  Abd- soft, non tender, non-distended, pfannenstiel incision with remnants of surgical glue.  Glue removed and incision well healed.   Ext- bilateral lower extremities with no edema      A&P: Kandi Parker is a 34 year old  s/p RLTCS on 4/24, course complicated by CHTN with SI pre-e and readmission for BP on 4/29 who presents today for routine postpartum visit.     (Z39.2) Routine postpartum follow-up  (primary encounter diagnosis)  Comment: Doing well. Incision well healed  Plan: condoms for BC    (O14.95) Preeclampsia in postpartum period  Comment: Reviewed clinical course of SI pre-e then readmit with SF.  Discussed BP.  Will decrease to 50mg labetalol BID given low home BPs  Plan: labetalol (NORMODYNE) 100 MG tablet    Elena Mabry MD      "

## 2020-06-23 ENCOUNTER — RECORDS - HEALTHEAST (OUTPATIENT)
Dept: ADMINISTRATIVE | Facility: OTHER | Age: 35
End: 2020-06-23

## 2020-06-23 ENCOUNTER — COMMUNICATION - HEALTHEAST (OUTPATIENT)
Dept: MIDWIFE SERVICES | Facility: CLINIC | Age: 35
End: 2020-06-23

## 2020-12-13 ENCOUNTER — HEALTH MAINTENANCE LETTER (OUTPATIENT)
Age: 35
End: 2020-12-13

## 2021-06-20 NOTE — LETTER
"Letter by Madisyn Napoles APRN, CNM at      Author: Madisyn Napoles APRN, CNM Service: -- Author Type: --    Filed:  Encounter Date: 6/23/2020 Status: (Other)         June 23, 2020     Ana Moura NP  [address]  [place] [address]    Patient: Rosana Parker       YOB: 1985   Date of Visit: 6/23/2020       Dear Ms. Martell NP:      I saw Rosana with her mother Kandi for [facility] services at the [facility] today.   Below are the relevant portions of my assessment and plan of care.         If you have questions, please do not hesitate to call me. I look forward to following Rosana along with you.    Sincerely,        HIRAL Hardin CNM, IBCLC        CC  No Recipients                             Assessment:   1.  Two month old infant gaining weight well on exclusive breastfeeding  2.  Shallow latch, likely due to breast fullness and rapid milk flow, causing nipple pain  3.  Excellent milk transfer  4.  Mother with ample milk supply    Plan:   1.  Demonstrated how to present breast in the \"sandwich\" hold, compressing breast vertically and in line with baby's mouth, for baby to get a larger mouthful of breast and a deeper latch.  Also demonstrated gentle chin pressure to encourage wider gape of baby's mouth on breast.    2.  Consider using the laid-back position for nursing, to encourage Rosana to open her mouth widely and help to manage fast milk flow.  Demonstrated position today, and given video links for further demonstration.  3.  Discussed ways to manage breast fullness and fast flow, such as reverse pressure softening and gentle pressure on upper part of breast during letdown.  Given writtne information.  4.  See pediatric provider as planned tomorrow and lactation if needed.    Subjective: Kandi is here today because of a new clicking sound when baby Rosana nurses, as well as nipple pain on the right side during feeding.  These have been present " for several weeks.  The clicking is present on both sides, the pain only on the right.  The pain does not occur with every feeding, but when it does, it persists for every feeding that day, and she is not able to decrease it by taking baby off the breast and re-latching.  Baby has been gaining weight very well, and otherwise nursing is going well.  Only other concern is that Rosana does not take a bottle well--will not accept a bottle from  at all, although she has accepted a bottle occasionally from [employer] herself.     Relevant History:  Was readmitted for postpartum pre-eclampsia on day 5    Breast Surgery: none    Breastfeeding Goals: continued exclusive breastfeeding    Previous Breastfeeding Experience:  first child x 9 months without difficulty                                                                                                                                              Infant's name: Rosana     Infant's bday: 20   Gestational age: 38 weeks  Infant's birth weight: 7# 12 oz      Mode of delivery: repeat    Infant's MD: Dr. Ana Cole,  So.  Kandi gives her permission for today's note to be forwarded to Dr. Cole.  MARY signed and filed in [facility]'s chart as Rosana has no local active pediatric chart   Discharge weight: 7 # 4 oz;  Most recent weight 9# 14 oz at one month appt     Frequency and duration of feedings: every 2 1/2 - 3 1/2 oz  Swallows audible per mother: yes  Numbers of feedings in 24 hours: 8  Number urines per day: 10  Number of stools per day and their color: 8    Supplementation: none   Pumping: only for relief using hand pump, yielding few ounces    Objective/Physical exam:     Mother: Noticed breasts grew larger and areolas darkened during pregnancy and she noticed primary engorgement when her milk came in.     Her nipples are everted, the areola is compressible, the breast is soft and full.     Sore nipples: yes, on right   EPDS:  2    Assessment of infant: 26.5% Weight for age percentile   Age today: 2 months  Today's weight: 11# 4.6 oz  Amount of milk transferred from LEFT side: none--baby appears satiated  Amount of milk transferred from RIGHT side: 4.8 oz    Baby has full flexion of arms and legs, normal tone, behavior is alert and active, respirations are normal, skin is normal, hydration is normal, jaw is normal size and alignment, palate is normal, frenulum is normal, baby can lateralize tongue, has adequate tongue lift, and tongue can protrude past bottom gum line.    Baby thrush: none      Jaundice: none      Feeding assessment: Baby can hold suction with tongue while at the breast, but early in feeding did not maintain suction well and pulled away from breast frequently, with milk coming from sides of mouth    Alignment: The baby was flex relaxed. Baby's head was aligned with its trunk. Baby did face mother. Baby was in cross cradle and laid back positions today.     Areolar Grasp: Baby did not initially open mouth widely, but was able to sustain wider gape after some gentle chin pressure given. Baby's lips were not pursed. Baby's lips did flange outward. Tongue was visible just barely over bottom lip. Baby initially had very poor seal, but as feeding progressed and baby was able to achieve deeper latch, seal was improved.    Areolar Compression: Baby made rhythmic motion. There were some clicking or smacking sounds with strong milk flow when baby had poor seal at breast. Nipple discomfort was present when baby had shallow latch;  Improved once chin pressure given and latch deepened.  Nipples appeared rounded after feeding.    Audible swallowing: Baby made quiet sounds of swallowing: There was an increase in frequency after milk ejection reflex. The milk ejection reflex is strong and milk supply is ample.     Madisyn Napoles, APRN, CNM, IBCLC

## 2021-08-07 ENCOUNTER — HEALTH MAINTENANCE LETTER (OUTPATIENT)
Age: 36
End: 2021-08-07

## 2021-09-26 ENCOUNTER — HEALTH MAINTENANCE LETTER (OUTPATIENT)
Age: 36
End: 2021-09-26

## 2022-08-28 ENCOUNTER — HEALTH MAINTENANCE LETTER (OUTPATIENT)
Age: 37
End: 2022-08-28

## 2023-01-14 ENCOUNTER — HEALTH MAINTENANCE LETTER (OUTPATIENT)
Age: 38
End: 2023-01-14

## 2023-09-24 ENCOUNTER — HEALTH MAINTENANCE LETTER (OUTPATIENT)
Age: 38
End: 2023-09-24

## 2024-02-11 ENCOUNTER — HEALTH MAINTENANCE LETTER (OUTPATIENT)
Age: 39
End: 2024-02-11

## (undated) DEVICE — SUCTION CANISTER MEDIVAC LINER 1500ML W/LID 65651-515

## (undated) DEVICE — ESU GROUND PAD UNIVERSAL W/O CORD

## (undated) DEVICE — BARRIER SEPRAFILM 5X6" SINGLE SHEET 4301-02

## (undated) DEVICE — ADH SKIN CLOSURE PREMIERPRO EXOFIN 1.0ML 3470

## (undated) DEVICE — STRAP KNEE/BODY 31143004

## (undated) DEVICE — BASIN SET MAJOR

## (undated) DEVICE — SU MONOCRYL 4-0 PS-2 18" UND Y496G

## (undated) DEVICE — GLOVE PROTEXIS BLUE W/NEU-THERA 6.5  2D73EB65

## (undated) DEVICE — SU VICRYL 0 CT-1 36" J346H

## (undated) DEVICE — SU MONOCRYL 0 CTB-1 36" YB946

## (undated) DEVICE — DRAPE SETUP C-SECTION INVISISHIELD 54X90" DYNJE5600

## (undated) DEVICE — CATH TRAY FOLEY 16FR BARDEX W/DRAIN BAG STATLOCK 300316A

## (undated) DEVICE — SOL WATER IRRIG 1000ML BOTTLE 07139-09

## (undated) DEVICE — SOL NACL 0.9% IRRIG 1000ML BOTTLE 07138-09

## (undated) DEVICE — PREP CHLORAPREP 26ML TINTED ORANGE  260815

## (undated) DEVICE — GLOVE ESTEEM POWDER FREE SMT 6.0  2D72PT60

## (undated) DEVICE — STOCKING SLEEVE COMPRESSION CALF LG

## (undated) DEVICE — PACK C-SECTION LF PL15OTA83B

## (undated) RX ORDER — PHENYLEPHRINE HCL IN 0.9% NACL 1 MG/10 ML
SYRINGE (ML) INTRAVENOUS
Status: DISPENSED
Start: 2020-04-24

## (undated) RX ORDER — METOCLOPRAMIDE HYDROCHLORIDE 5 MG/ML
INJECTION INTRAMUSCULAR; INTRAVENOUS
Status: DISPENSED
Start: 2020-04-24

## (undated) RX ORDER — MORPHINE SULFATE 1 MG/ML
INJECTION, SOLUTION EPIDURAL; INTRATHECAL; INTRAVENOUS
Status: DISPENSED
Start: 2020-04-24

## (undated) RX ORDER — ONDANSETRON 2 MG/ML
INJECTION INTRAMUSCULAR; INTRAVENOUS
Status: DISPENSED
Start: 2020-04-24

## (undated) RX ORDER — FENTANYL CITRATE 50 UG/ML
INJECTION, SOLUTION INTRAMUSCULAR; INTRAVENOUS
Status: DISPENSED
Start: 2020-04-24

## (undated) RX ORDER — KETOROLAC TROMETHAMINE 30 MG/ML
INJECTION, SOLUTION INTRAMUSCULAR; INTRAVENOUS
Status: DISPENSED
Start: 2020-04-24